# Patient Record
Sex: FEMALE | Race: WHITE | Employment: OTHER | URBAN - METROPOLITAN AREA
[De-identification: names, ages, dates, MRNs, and addresses within clinical notes are randomized per-mention and may not be internally consistent; named-entity substitution may affect disease eponyms.]

---

## 2022-12-28 LAB
ANION GAP SERPL CALCULATED.3IONS-SCNC: 10 MEQ/L (ref 7–16)
BUN BLDV-MCNC: 27 MG/DL (ref 8–23)
CALCIUM SERPL-MCNC: 9.7 MG/DL (ref 8.5–10.5)
CHLORIDE BLD-SCNC: 104 MEQ/L (ref 95–107)
CO2: 25 MEQ/L (ref 19–31)
CREAT SERPL-MCNC: 1.31 MG/DL (ref 0.6–1.3)
EGFR IF NONAFRICAN AMERICAN: 42 ML/MIN/1.73
GLUCOSE: 108 MG/DL (ref 70–99)
POTASSIUM SERPL-SCNC: 4.3 MEQ/L (ref 3.5–5.4)
SODIUM BLD-SCNC: 139 MEQ/L (ref 133–146)

## 2023-10-06 LAB
AMIKACIN: ABNORMAL
AMOXICILLIN + CLAVULANATE: ABNORMAL
AMPICILLIN: ABNORMAL
AZTREONAM: ABNORMAL
CEFAZOLIN: ABNORMAL
CEFEPIME: ABNORMAL
CEFOXITIN: ABNORMAL
CEFTRIAXONE: ABNORMAL
CIPROFLOXACIN: ABNORMAL
CULTURE RESULT: ABNORMAL
ERTAPENEM: ABNORMAL
GENTAMICIN: ABNORMAL
IMIPENEM: ABNORMAL
LEVOFLOXACIN: ABNORMAL
NITROFURANTOIN: ABNORMAL
PIPERACILLIN + TAZOBACTAM: ABNORMAL
TIGECYCLINE: ABNORMAL
TOBRAMYCIN: ABNORMAL
TRIMETHOPRIM + SULFAMETHOXAZOLE: ABNORMAL
URINE CULTURE, ROUTINE: ABNORMAL STATUS

## 2023-10-20 LAB — URINE CULTURE, ROUTINE: NORMAL STATUS

## 2023-10-30 LAB
ALBUMIN, U: 100 MG/DL
AMIKACIN: ABNORMAL
AMOXICILLIN + CLAVULANATE: ABNORMAL
AMPICILLIN: ABNORMAL
AZTREONAM: ABNORMAL
BACTERIA, URINE: ABNORMAL /HPF
BILIRUBIN, URINE: ABNORMAL
CEFAZOLIN: ABNORMAL
CEFEPIME: ABNORMAL
CEFOXITIN: ABNORMAL
CEFTRIAXONE: ABNORMAL
CHARACTER, URINE: ABNORMAL
CIPROFLOXACIN: ABNORMAL
COLOR, URINE: ABNORMAL
CULTURE RESULT: ABNORMAL
ERTAPENEM: ABNORMAL
GENTAMICIN: ABNORMAL
GLUCOSE, URINE: ABNORMAL MG/DL
IMIPENEM: ABNORMAL
KETONES, URINE: ABNORMAL MG/DL
LEUKOCYTE ESTERASE, URINE: ABNORMAL
LEVOFLOXACIN: ABNORMAL
MUCUS, URINE: ABNORMAL /HPF
NITRITE, URINE: ABNORMAL
NITROFURANTOIN: ABNORMAL
OCCULT BLOOD,URINE: ABNORMAL
PH, URINE: 6
PIPERACILLIN + TAZOBACTAM: ABNORMAL
RBC URINE: ABNORMAL /HPF
SPECIFIC GRAVITY UA: 1.01
SQUAMOUS EPITHELIAL CELLS: ABNORMAL /HPF
TIGECYCLINE: ABNORMAL
TOBRAMYCIN: ABNORMAL
TRIMETHOPRIM + SULFAMETHOXAZOLE: ABNORMAL
URINE CULTURE REFLEX: YES
URINE CULTURE, ROUTINE: ABNORMAL STATUS
UROBILINOGEN, URINE: 0.2 MG/DL
WBC URINE: ABNORMAL /HPF

## 2024-02-29 ENCOUNTER — HOSPITAL ENCOUNTER (INPATIENT)
Age: 79
LOS: 2 days | Discharge: HOME | DRG: 690 | End: 2024-03-02
Attending: EMERGENCY MEDICINE | Admitting: HOSPITALIST
Payer: MEDICARE

## 2024-02-29 DIAGNOSIS — N39.0 ACUTE UTI: Primary | ICD-10-CM

## 2024-02-29 PROBLEM — I10 ESSENTIAL HYPERTENSION: Status: ACTIVE | Noted: 2024-02-29

## 2024-02-29 PROBLEM — E78.5 DYSLIPIDEMIA: Status: ACTIVE | Noted: 2024-02-29

## 2024-02-29 PROBLEM — H40.9 GLAUCOMA: Status: ACTIVE | Noted: 2024-02-29

## 2024-02-29 LAB
ANION GAP SERPL CALCULATED.3IONS-SCNC: 13 MMOL/L (ref 9–17)
BACTERIA URNS QL MICRO: ABNORMAL
BASOPHILS # BLD: 0 K/UL (ref 0–0.2)
BASOPHILS NFR BLD: 1 % (ref 0–2)
BILIRUB UR QL STRIP: NEGATIVE
BUN SERPL-MCNC: 24 MG/DL (ref 8–23)
CALCIUM SERPL-MCNC: 9.9 MG/DL (ref 8.6–10.4)
CHARACTER UR: ABNORMAL
CHLORIDE SERPL-SCNC: 99 MMOL/L (ref 98–107)
CLARITY UR: ABNORMAL
CO2 SERPL-SCNC: 24 MMOL/L (ref 20–31)
COLOR UR: YELLOW
CREAT SERPL-MCNC: 1.5 MG/DL (ref 0.5–0.9)
EOSINOPHIL # BLD: 0 K/UL (ref 0–0.4)
EOSINOPHILS RELATIVE PERCENT: 0 % (ref 1–4)
EPI CELLS #/AREA URNS HPF: ABNORMAL /HPF (ref 0–5)
ERYTHROCYTE [DISTWIDTH] IN BLOOD BY AUTOMATED COUNT: 13.7 % (ref 12.5–15.4)
GFR SERPL CREATININE-BSD FRML MDRD: 36 ML/MIN/1.73M2
GLUCOSE SERPL-MCNC: 124 MG/DL (ref 70–99)
GLUCOSE UR STRIP-MCNC: NEGATIVE MG/DL
HCT VFR BLD AUTO: 32.5 % (ref 36–46)
HGB BLD-MCNC: 10.9 G/DL (ref 12–16)
HGB UR QL STRIP.AUTO: ABNORMAL
KETONES UR STRIP-MCNC: ABNORMAL MG/DL
LEUKOCYTE ESTERASE UR QL STRIP: ABNORMAL
LYMPHOCYTES NFR BLD: 0.6 K/UL (ref 1–4.8)
LYMPHOCYTES RELATIVE PERCENT: 14 % (ref 24–44)
MCH RBC QN AUTO: 28.8 PG (ref 26–34)
MCHC RBC AUTO-ENTMCNC: 33.4 G/DL (ref 31–37)
MCV RBC AUTO: 86.3 FL (ref 80–100)
MONOCYTES NFR BLD: 0.4 K/UL (ref 0.1–1.2)
MONOCYTES NFR BLD: 10 % (ref 2–11)
NEUTROPHILS NFR BLD: 75 % (ref 36–66)
NEUTS SEG NFR BLD: 3.2 K/UL (ref 1.8–7.7)
NITRITE UR QL STRIP: NEGATIVE
PH UR STRIP: 6 [PH] (ref 5–8)
PLATELET # BLD AUTO: 262 K/UL (ref 140–450)
PMV BLD AUTO: 8.9 FL (ref 6–12)
POTASSIUM SERPL-SCNC: 3.7 MMOL/L (ref 3.7–5.3)
PROT UR STRIP-MCNC: ABNORMAL MG/DL
RBC # BLD AUTO: 3.77 M/UL (ref 4–5.2)
RBC #/AREA URNS HPF: ABNORMAL /HPF (ref 0–2)
SODIUM SERPL-SCNC: 136 MMOL/L (ref 135–144)
SP GR UR STRIP: 1.02 (ref 1–1.03)
UROBILINOGEN UR STRIP-ACNC: NORMAL EU/DL (ref 0–1)
WBC #/AREA URNS HPF: ABNORMAL /HPF (ref 0–5)
WBC OTHER # BLD: 4.3 K/UL (ref 3.5–11)

## 2024-02-29 PROCEDURE — 87088 URINE BACTERIA CULTURE: CPT

## 2024-02-29 PROCEDURE — 81001 URINALYSIS AUTO W/SCOPE: CPT

## 2024-02-29 PROCEDURE — 36415 COLL VENOUS BLD VENIPUNCTURE: CPT

## 2024-02-29 PROCEDURE — 2580000003 HC RX 258: Performed by: EMERGENCY MEDICINE

## 2024-02-29 PROCEDURE — 6360000002 HC RX W HCPCS: Performed by: EMERGENCY MEDICINE

## 2024-02-29 PROCEDURE — 1200000000 HC SEMI PRIVATE

## 2024-02-29 PROCEDURE — 2580000003 HC RX 258

## 2024-02-29 PROCEDURE — 6370000000 HC RX 637 (ALT 250 FOR IP)

## 2024-02-29 PROCEDURE — 85025 COMPLETE CBC W/AUTO DIFF WBC: CPT

## 2024-02-29 PROCEDURE — 99232 SBSQ HOSP IP/OBS MODERATE 35: CPT | Performed by: HOSPITALIST

## 2024-02-29 PROCEDURE — APPSS45 APP SPLIT SHARED TIME 31-45 MINUTES

## 2024-02-29 PROCEDURE — 99285 EMERGENCY DEPT VISIT HI MDM: CPT

## 2024-02-29 PROCEDURE — 87186 SC STD MICRODIL/AGAR DIL: CPT

## 2024-02-29 PROCEDURE — 87086 URINE CULTURE/COLONY COUNT: CPT

## 2024-02-29 PROCEDURE — 80048 BASIC METABOLIC PNL TOTAL CA: CPT

## 2024-02-29 RX ORDER — ACETAMINOPHEN 325 MG/1
650 TABLET ORAL EVERY 6 HOURS PRN
Status: DISCONTINUED | OUTPATIENT
Start: 2024-02-29 | End: 2024-03-02 | Stop reason: HOSPADM

## 2024-02-29 RX ORDER — LATANOPROST 50 UG/ML
1 SOLUTION/ DROPS OPHTHALMIC NIGHTLY
Status: DISCONTINUED | OUTPATIENT
Start: 2024-02-29 | End: 2024-03-02 | Stop reason: HOSPADM

## 2024-02-29 RX ORDER — BUTALBITAL, ACETAMINOPHEN AND CAFFEINE 50; 325; 40 MG/1; MG/1; MG/1
1 TABLET ORAL EVERY 4 HOURS PRN
Status: DISCONTINUED | OUTPATIENT
Start: 2024-02-29 | End: 2024-03-02 | Stop reason: HOSPADM

## 2024-02-29 RX ORDER — POTASSIUM CHLORIDE 20 MEQ/1
40 TABLET, EXTENDED RELEASE ORAL PRN
Status: DISCONTINUED | OUTPATIENT
Start: 2024-02-29 | End: 2024-03-02 | Stop reason: HOSPADM

## 2024-02-29 RX ORDER — ONDANSETRON 2 MG/ML
4 INJECTION INTRAMUSCULAR; INTRAVENOUS EVERY 6 HOURS PRN
Status: DISCONTINUED | OUTPATIENT
Start: 2024-02-29 | End: 2024-03-02 | Stop reason: HOSPADM

## 2024-02-29 RX ORDER — SODIUM CHLORIDE 9 MG/ML
INJECTION, SOLUTION INTRAVENOUS PRN
Status: DISCONTINUED | OUTPATIENT
Start: 2024-02-29 | End: 2024-03-02 | Stop reason: HOSPADM

## 2024-02-29 RX ORDER — ACETAMINOPHEN 650 MG/1
650 SUPPOSITORY RECTAL EVERY 6 HOURS PRN
Status: DISCONTINUED | OUTPATIENT
Start: 2024-02-29 | End: 2024-03-02 | Stop reason: HOSPADM

## 2024-02-29 RX ORDER — FENOFIBRATE 54 MG/1
54 TABLET ORAL DAILY
Status: DISCONTINUED | OUTPATIENT
Start: 2024-02-29 | End: 2024-03-02 | Stop reason: HOSPADM

## 2024-02-29 RX ORDER — POTASSIUM CHLORIDE 7.45 MG/ML
10 INJECTION INTRAVENOUS PRN
Status: DISCONTINUED | OUTPATIENT
Start: 2024-02-29 | End: 2024-03-02 | Stop reason: HOSPADM

## 2024-02-29 RX ORDER — SODIUM CHLORIDE 0.9 % (FLUSH) 0.9 %
5-40 SYRINGE (ML) INJECTION PRN
Status: DISCONTINUED | OUTPATIENT
Start: 2024-02-29 | End: 2024-03-02 | Stop reason: HOSPADM

## 2024-02-29 RX ORDER — MAGNESIUM SULFATE IN WATER 40 MG/ML
2000 INJECTION, SOLUTION INTRAVENOUS PRN
Status: DISCONTINUED | OUTPATIENT
Start: 2024-02-29 | End: 2024-03-02 | Stop reason: HOSPADM

## 2024-02-29 RX ORDER — SODIUM CHLORIDE 0.9 % (FLUSH) 0.9 %
5-40 SYRINGE (ML) INJECTION EVERY 12 HOURS SCHEDULED
Status: DISCONTINUED | OUTPATIENT
Start: 2024-02-29 | End: 2024-03-02 | Stop reason: HOSPADM

## 2024-02-29 RX ORDER — POLYETHYLENE GLYCOL 3350 17 G/17G
17 POWDER, FOR SOLUTION ORAL DAILY PRN
Status: DISCONTINUED | OUTPATIENT
Start: 2024-02-29 | End: 2024-03-02 | Stop reason: HOSPADM

## 2024-02-29 RX ORDER — ENOXAPARIN SODIUM 100 MG/ML
40 INJECTION SUBCUTANEOUS DAILY
Status: DISCONTINUED | OUTPATIENT
Start: 2024-02-29 | End: 2024-03-02 | Stop reason: HOSPADM

## 2024-02-29 RX ORDER — ATORVASTATIN CALCIUM 40 MG/1
40 TABLET, FILM COATED ORAL DAILY
Status: DISCONTINUED | OUTPATIENT
Start: 2024-02-29 | End: 2024-03-02 | Stop reason: HOSPADM

## 2024-02-29 RX ORDER — AMLODIPINE BESYLATE 5 MG/1
2.5 TABLET ORAL DAILY
Status: DISCONTINUED | OUTPATIENT
Start: 2024-02-29 | End: 2024-03-02 | Stop reason: HOSPADM

## 2024-02-29 RX ORDER — ONDANSETRON 4 MG/1
4 TABLET, ORALLY DISINTEGRATING ORAL EVERY 8 HOURS PRN
Status: DISCONTINUED | OUTPATIENT
Start: 2024-02-29 | End: 2024-03-02 | Stop reason: HOSPADM

## 2024-02-29 RX ADMIN — MEROPENEM 1000 MG: 1 INJECTION, POWDER, FOR SOLUTION INTRAVENOUS at 14:50

## 2024-02-29 RX ADMIN — SODIUM CHLORIDE, PRESERVATIVE FREE 7 ML: 5 INJECTION INTRAVENOUS at 21:00

## 2024-02-29 RX ADMIN — AMLODIPINE BESYLATE 2.5 MG: 5 TABLET ORAL at 20:35

## 2024-02-29 RX ADMIN — LATANOPROST 1 DROP: 50 SOLUTION OPHTHALMIC at 20:25

## 2024-02-29 ASSESSMENT — LIFESTYLE VARIABLES
HOW OFTEN DO YOU HAVE A DRINK CONTAINING ALCOHOL: NEVER
HOW MANY STANDARD DRINKS CONTAINING ALCOHOL DO YOU HAVE ON A TYPICAL DAY: PATIENT DOES NOT DRINK
HOW MANY STANDARD DRINKS CONTAINING ALCOHOL DO YOU HAVE ON A TYPICAL DAY: PATIENT DOES NOT DRINK

## 2024-02-29 ASSESSMENT — PAIN - FUNCTIONAL ASSESSMENT
PAIN_FUNCTIONAL_ASSESSMENT: NONE - DENIES PAIN
PAIN_FUNCTIONAL_ASSESSMENT: NONE - DENIES PAIN

## 2024-02-29 NOTE — ED PROVIDER NOTES
[facility] Emergency Department  [address]  [facility] [address]  Phone: [phone]  Fax: [phone]  EMERGENCY DEPARTMENT ENCOUNTER      Pt Name: Stella Courtney  MRN: [id number]  Birthdate 1945  Date of evaluation: 2/29/2024    CHIEF COMPLAINT       Chief Complaint   Patient presents with    Flank Pain     Pt to ED with c/o R flank pain, sweats, HA, and chills since Monday evening. Pt also notes decreased appetite. Pt sees Dr Bolivar for chronic UTIs.        HISTORY OF PRESENT ILLNESS    Stella Courtney is a 78 y.o. female who presents to the emergency department with right-sided flank pain, chills and diaphoresis since Monday intermittently.  Decreased appetite was sent in by her urologist for evaluation.  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  She was supposed to follow-up with infectious disease yesterday however she was too sick to get out of bed and is scheduled for 2 weeks from now.  She denies any chest pain or shortness of breath.  No hematuria or dysuria.  She denies any other complaints.    REVIEW OF SYSTEMS       Constitutional: No fevers positive chills  HENT: No sore throat, rhinorrhea, or earache   Eyes: No blurry vision or double vision no drainage   Cardiovascular: No chest pain or tachycardia   Respiratory: No wheezing or shortness of breath no cough   Gastrointestinal: Has decreased appetite nausea no vomiting or diarrhea no abdominal pain  : No hematuria or dysuria   Musculoskeletal: No extremity swelling or pain positive right flank pain  Skin: No rash   Neurological: No focal neurologic complaints, paresthesias, weakness, or headache     PAST MEDICAL HISTORY    has a past medical history of Hyperlipidemia and UTI (urinary tract infection).    SURGICAL HISTORY      has a past surgical history that includes Hysterectomy, total abdominal (1989); Breast lumpectomy (2017); Total knee arthroplasty (12/2017); and Cataract  NEGATIVE NEGATIVE    Ketones, Urine TRACE (A) NEGATIVE mg/dL    Specific Gravity, UA 1.020 1.005 - 1.030    Urine Hgb MODERATE (A) NEGATIVE    pH, UA 6.0 5.0 - 8.0    Protein, UA 3+ (A) NEGATIVE mg/dL    Urobilinogen, Urine Normal 0.0 - 1.0 EU/dL    Nitrite, Urine NEGATIVE NEGATIVE    Leukocyte Esterase, Urine LARGE (A) NEGATIVE   Microscopic Urinalysis   Result Value Ref Range    WBC, UA TOO NUMEROUS TO COUNT 0 - 5 /HPF    RBC, UA 2 TO 5 0 - 2 /HPF    Epithelial Cells UA 0 TO 2 0 - 5 /HPF    Bacteria, UA MODERATE (A) None    Other Observations UA Culture ordered based on defined criteria. (A) NOT REQ.       Not indicated unless otherwise documented above    RADIOLOGY:   I reviewed the radiologist interpretations:    No orders to display       Not indicated unless otherwise documented above    EMERGENCY DEPARTMENT COURSE:     The patient was given the following medications:  Orders Placed This Encounter   Medications    meropenem (MERREM) 1,000 mg in sodium chloride 0.9 % 100 mL IVPB (mini-bag)     Order Specific Question:   Antimicrobial Indications     Answer:   Urinary Tract Infection        Vitals:   -------------------------  /71   Pulse 69   Temp 97.9 °F (36.6 °C) (Oral)   Resp 16   Ht 1.753 m (5' 9\")   Wt 81.6 kg (180 lb)   SpO2 99%   BMI 26.58 kg/m²     1:35 PM normal electrolytes kidney function BUN and creatinine slightly elevated 24 and 1.5.  Urinalysis shows large leukocytes negative nitrate with moderate bacteria too numerous to count white blood cells normal white blood cell count and mild anemia hemoglobin 10.9.  Discussed with Dr. Armas who agrees to admission for PICC line placement and arranging of outpatient IV antibiotics.  Recommends IV meropenem.  Patient agreeable to admission    CRITICAL CARE:    None    PROCEDURES:    None      OARRS Report if indicated         FINAL IMPRESSION      1. Acute UTI          DISPOSITION/PLAN   DISPOSITION Admitted 02/29/2024 01:37:58

## 2024-02-29 NOTE — H&P
[facility]  Office: [phone]  Nathaniel Armas DO, Eh Ortega DO, Matt Black DO, Nithin Cutler DO, Ben Marcum MD, Dorothea Lee MD, Samm Dennis MD, Naida Olmedo MD,  Eliazar Mckeon MD, Ethan Mccoy MD, Wade Meyer MD,  Oleg East DO, Umm Zimmer MD, Kasi Tyler MD, Ariel Armas DO, Edith Pena MD,  Aleksandr Mckinley DO, Emperatriz Camargo MD, Farrah Berg MD, Henny Davies MD, Silvano Angel MD,  Dimitri Pagan MD, Betsy Matthews MD, Hector Richards MD, Cornelio Richey MD, Bradley High MD, Edmond Woodson MD, Nagi Hidalgo DO, Rm Allen DO, Ruben Berg MD,  Santana Martin MD, Shirley Waterhouse, CNP,  Ella Hare CNP, Ralph York, CNP,  Cookie Haro, DNP, Hetal Anders, CNP, Bhumi Kay, CNP, Annabelle Menchaca CNP, Mirna Leija CNP, Natividad Galindo, CNP, Mary Grace Amaya, PA-C, Doris Blanchard PA-C, Anitra Bowers, CNP, Sydnie Eldridge, CNP, Rain Basurto, CNP, Sobeida Grey, CNS, Setlla Moss, JAILENE, Lisa Angulo, CNP, Tracy Schwab, CNP         [facility]   IN-PATIENT SERVICE   [facility]    HISTORY AND PHYSICAL EXAMINATION            Date:   2/29/2024  Patient name:  Stella Courtney  Date of admission:  2/29/2024 11:01 AM  MRN:   [id number]  Account:  [id number]  YOB: 1945  PCP:    Amaya Mcmillan MD  Room:   [facility]  Code Status:    No Order      History Obtained From:     patient, electronic medical record    History of Present Illness:     Stella Courtney is a 78 y.o. Non- / non  female who presents with Flank Pain (Pt to ED with c/o R flank pain, sweats, HA, and chills since Monday evening. Pt also notes decreased appetite. Pt sees Dr Bolivar for chronic UTIs. )   and is admitted to the hospital for the management of Complicated urinary tract infection.    Patient is a very pleasant 78-year-old female who is admitted for management of complicated urinary tract infection.  Patient does  36 - 66 %    Lymphocytes % 14 (L) 24 - 44 %    Monocytes % 10 2 - 11 %    Eosinophils % 0 (L) 1 - 4 %    Basophils % 1 0 - 2 %    Neutrophils Absolute 3.20 1.8 - 7.7 k/uL    Lymphocytes Absolute 0.60 (L) 1.0 - 4.8 k/uL    Monocytes Absolute 0.40 0.1 - 1.2 k/uL    Eosinophils Absolute 0.00 0.0 - 0.4 k/uL    Basophils Absolute 0.00 0.0 - 0.2 k/uL   Urinalysis with Reflex to Culture    Collection Time: 02/29/24 11:17 AM    Specimen: Urine, clean catch   Result Value Ref Range    Color, UA Yellow Yellow    Turbidity UA Cloudy (A) Clear    Glucose, Ur NEGATIVE NEGATIVE mg/dL    Bilirubin Urine NEGATIVE NEGATIVE    Ketones, Urine TRACE (A) NEGATIVE mg/dL    Specific Gravity, UA 1.020 1.005 - 1.030    Urine Hgb MODERATE (A) NEGATIVE    pH, UA 6.0 5.0 - 8.0    Protein, UA 3+ (A) NEGATIVE mg/dL    Urobilinogen, Urine Normal 0.0 - 1.0 EU/dL    Nitrite, Urine NEGATIVE NEGATIVE    Leukocyte Esterase, Urine LARGE (A) NEGATIVE   Microscopic Urinalysis    Collection Time: 02/29/24 11:17 AM   Result Value Ref Range    WBC, UA TOO NUMEROUS TO COUNT 0 - 5 /HPF    RBC, UA 2 TO 5 0 - 2 /HPF    Epithelial Cells UA 0 TO 2 0 - 5 /HPF    Bacteria, UA MODERATE (A) None    Other Observations UA Culture ordered based on defined criteria. (A) NOT REQ.   Basic Metabolic Panel    Collection Time: 02/29/24 11:41 AM   Result Value Ref Range    Sodium 136 135 - 144 mmol/L    Potassium 3.7 3.7 - 5.3 mmol/L    Chloride 99 98 - 107 mmol/L    CO2 24 20 - 31 mmol/L    Anion Gap 13 9 - 17 mmol/L    Glucose 124 (H) 70 - 99 mg/dL    BUN 24 (H) 8 - 23 mg/dL    Creatinine 1.5 (H) 0.5 - 0.9 mg/dL    Est, Glom Filt Rate 36 (L) >60 mL/min/1.73m2    Calcium 9.9 8.6 - 10.4 mg/dL       Imaging/Diagnostics:  No results found.    Assessment :      Hospital Problems             Last Modified POA    * (Principal) Complicated urinary tract infection 2/29/2024 Yes    Essential hypertension 2/29/2024 Yes    Dyslipidemia 2/29/2024 Yes    Glaucoma 2/29/2024 Yes       Plan:

## 2024-03-01 PROCEDURE — 99254 IP/OBS CNSLTJ NEW/EST MOD 60: CPT | Performed by: NURSE PRACTITIONER

## 2024-03-01 PROCEDURE — 6360000002 HC RX W HCPCS

## 2024-03-01 PROCEDURE — 6370000000 HC RX 637 (ALT 250 FOR IP)

## 2024-03-01 PROCEDURE — 2580000003 HC RX 258

## 2024-03-01 PROCEDURE — 05HD33Z INSERTION OF INFUSION DEVICE INTO RIGHT CEPHALIC VEIN, PERCUTANEOUS APPROACH: ICD-10-PCS | Performed by: HOSPITALIST

## 2024-03-01 PROCEDURE — 1200000000 HC SEMI PRIVATE

## 2024-03-01 PROCEDURE — APPSS30 APP SPLIT SHARED TIME 16-30 MINUTES

## 2024-03-01 PROCEDURE — 99232 SBSQ HOSP IP/OBS MODERATE 35: CPT | Performed by: HOSPITALIST

## 2024-03-01 RX ORDER — SODIUM CHLORIDE 9 MG/ML
25 INJECTION, SOLUTION INTRAVENOUS PRN
Status: DISCONTINUED | OUTPATIENT
Start: 2024-03-01 | End: 2024-03-02 | Stop reason: HOSPADM

## 2024-03-01 RX ORDER — SODIUM CHLORIDE 0.9 % (FLUSH) 0.9 %
5-40 SYRINGE (ML) INJECTION EVERY 12 HOURS SCHEDULED
Status: DISCONTINUED | OUTPATIENT
Start: 2024-03-01 | End: 2024-03-02 | Stop reason: HOSPADM

## 2024-03-01 RX ORDER — SODIUM CHLORIDE 0.9 % (FLUSH) 0.9 %
5-40 SYRINGE (ML) INJECTION PRN
Status: DISCONTINUED | OUTPATIENT
Start: 2024-03-01 | End: 2024-03-02 | Stop reason: HOSPADM

## 2024-03-01 RX ORDER — SODIUM CHLORIDE 9 MG/ML
INJECTION, SOLUTION INTRAVENOUS CONTINUOUS
Status: DISCONTINUED | OUTPATIENT
Start: 2024-03-01 | End: 2024-03-02 | Stop reason: HOSPADM

## 2024-03-01 RX ORDER — LIDOCAINE HYDROCHLORIDE 10 MG/ML
5 INJECTION, SOLUTION EPIDURAL; INFILTRATION; INTRACAUDAL; PERINEURAL ONCE
Status: DISCONTINUED | OUTPATIENT
Start: 2024-03-01 | End: 2024-03-02 | Stop reason: HOSPADM

## 2024-03-01 RX ADMIN — MEROPENEM 1000 MG: 1 INJECTION, POWDER, FOR SOLUTION INTRAVENOUS at 10:11

## 2024-03-01 RX ADMIN — ACETAMINOPHEN 650 MG: 325 TABLET ORAL at 20:15

## 2024-03-01 RX ADMIN — AMLODIPINE BESYLATE 2.5 MG: 5 TABLET ORAL at 07:53

## 2024-03-01 RX ADMIN — ATORVASTATIN CALCIUM 40 MG: 40 TABLET, FILM COATED ORAL at 07:53

## 2024-03-01 RX ADMIN — SODIUM CHLORIDE, PRESERVATIVE FREE 10 ML: 5 INJECTION INTRAVENOUS at 07:53

## 2024-03-01 RX ADMIN — LATANOPROST 1 DROP: 50 SOLUTION OPHTHALMIC at 20:16

## 2024-03-01 RX ADMIN — SODIUM CHLORIDE: 9 INJECTION, SOLUTION INTRAVENOUS at 10:10

## 2024-03-01 RX ADMIN — MEROPENEM 1000 MG: 1 INJECTION, POWDER, FOR SOLUTION INTRAVENOUS at 20:21

## 2024-03-01 RX ADMIN — FENOFIBRATE 54 MG: 54 TABLET, FILM COATED ORAL at 07:53

## 2024-03-01 ASSESSMENT — PAIN SCALES - GENERAL
PAINLEVEL_OUTOF10: 2
PAINLEVEL_OUTOF10: 0

## 2024-03-01 ASSESSMENT — PAIN DESCRIPTION - DESCRIPTORS: DESCRIPTORS: ACHING;DISCOMFORT

## 2024-03-01 ASSESSMENT — PAIN DESCRIPTION - LOCATION: LOCATION: HEAD

## 2024-03-01 NOTE — CARE COORDINATION
Case Management Assessment  Initial Evaluation    Date/Time of Evaluation: 3/1/2024 10:54 AM  Assessment Completed by: [place] RN    If patient is discharged prior to next notation, then this note serves as note for discharge by case management.    Patient Name: Stella Courtney                   YOB: 1945  Diagnosis: Complicated urinary tract infection [N39.0]  Acute UTI [N39.0]                   Date / Time: 2/29/2024 11:01 AM    Patient Admission Status: Inpatient   Readmission Risk (Low < 19, Mod (19-27), High > 27): Readmission Risk Score: 9    Current PCP: Amaya Mcmillan MD  PCP verified by CM? Yes    Chart Reviewed: Yes      History Provided by: Patient  Patient Orientation: Alert and Oriented, Person, Place, Situation, Self    Patient Cognition: Alert    Hospitalization in the last 30 days (Readmission):  No    If yes, Readmission Assessment in  Navigator will be completed.    Advance Directives:      Code Status: Full Code   Patient's Primary Decision Maker is: Legal Next of Kin      Discharge Planning:    Patient lives with: Family Members Type of Home: House  Primary Care Giver: Self  Patient Support Systems include: Family Members, Friends/Neighbors   Current Financial resources: Medicare  Current community resources: None  Current services prior to admission: Durable Medical Equipment            Current DME: (S) Wheelchair, Cane, Walker (Rollator)            Type of Home Care services:  None    ADLS  Prior functional level: Independent in ADLs/IADLs  Current functional level: Independent in ADLs/IADLs    PT AM-PAC:   /24  OT AM-PAC:   /24    Family can provide assistance at DC: Yes  Would you like Case Management to discuss the discharge plan with any other family members/significant others, and if so, who? No  Plans to Return to Present Housing: Yes  Other Identified Issues/Barriers to RETURNING to current housing: none  Potential Assistance needed at discharge: Home

## 2024-03-01 NOTE — PROGRESS NOTES
03/01/24 1250   Encounter Summary   Encounter Overview/Reason  Volunteer Encounter   Service Provided For: Patient   Referral/Consult From: Audie   Last Encounter  03/01/24   Spiritual/Emotional needs   Type Spiritual Support

## 2024-03-01 NOTE — PROGRESS NOTES
SPIRITUAL CARE DEPARTMENT - [facility]  PROGRESS NOTE    Room # 304/304-01   Name: Stella Courtney               Reason for visit: Volunteer referred     I visited the patient.    Admit Date & Time: 2/29/2024 11:01 AM    Assessment:  Stella Courtney is a 78 y.o. female in the hospital because \"UTI\". Upon entering the room pt was lying in bed. Patient sat up shortly after this writer entered the room and sat in chair. Patient thanked writer for coffee this writer brought her, that volunteer asked him to bring. Patient shared about her current medical challenge. Pt shared feeling \"good\" about the possibility of being discharged soon. Pt appeared to be calm and coping at this time. Pt did not express any other needs or concerns to writer at this time.     Intervention:  I introduced myself and my title as  I offered space for patient  to express feelings, needs, and concerns and provided a ministry presence. Writer delivered coffee to patient that Spiritual Health Partner Volunteer asked him to bring. Write actively listened to patient, and assured pt of his prayers for pt.     Outcome:  Pt expressed gratitude to writer for visit     Plan:  Chaplains will remain available to offer spiritual and emotional support as needed.    Electronically signed by Chaplain Nicolette, on 3/1/2024 at 10:49 AM.  Spiritual Care Department  [facility]        03/01/24 1046   Encounter Summary   Encounter Overview/Reason  Initial Encounter   Service Provided For: Patient   Referral/Consult From: Volunteer   Support System Unknown   Last Encounter  03/01/24   Complexity of Encounter Low   Begin Time 1020   End Time  1028   Total Time Calculated 8 min   Spiritual/Emotional needs   Type Spiritual Support   Assessment/Intervention/Outcome   Assessment Calm;Coping   Intervention Active listening;Sustaining Presence/Ministry of presence;Explored/Affirmed feelings, thoughts, concerns   Outcome Engaged in conversation;Expressed  Gratitude;Coping

## 2024-03-01 NOTE — CONSULTS
[facility]  Initial Consult Note  Date: 3/1/2024    Hospital day :1     Impression:   E. coli urinary tract infection  Recent urine culture 2/14/2024 positive for ESBL E. coli  Essentially pretension    Recommendations   The patient has been started on IV meropenem and can continue on this for now  Discharge plans will be to transition the patient to IV ertapenem on an outpatient basis to complete a 7-day course  Midline placed in the right upper extremity  The patient is okay for discharge from an infectious disease standpoint, we will transition her to IV ertapenem 1 g IV daily on an outpatient basis for ease of daily dosing  She already has an outpatient follow-up scheduled with Dr. Rios in 2 weeks, she can keep this appointment    Chief complaint/reason for consultation:   UTI    History of Present Illness:   Stella Courtney is a 78 y.o.-year-old female who was initially admitted on 2/29/2024.  Patient has a known history of hyperlipidemia, urinary tract infections , most recent was a urine culture 2/14/2024 positive for ESBL E. coli.  She tells me that when she is given antibiotics for urinary tract infections, it is typically just because she has cloudy urine and her urine cultures are positive.  She does report that on Monday she had chills, shakes, and just did not feel well overall.  She apparently was supposed to come to our office 2/28/2024 but felt so poorly she could not get out of bed so she changed her appointment and came to the emergency department 2/29/2024 at the direction of her urologist.  She denies any urinary frequency or urgency, no cloudy urine, she denies any abdominal pain or back pain.  She was started on IV meropenem due to recent urine culture positive for ESBL E. coli.  We were consulted to assist with antibiotic therapy given the ESBL E. coli.    I have personally reviewed the past medical history, past surgical history, medications, social history, and family

## 2024-03-01 NOTE — PROGRESS NOTES
[facility]  Office: [phone]  Nathaniel Armas DO, Eh Ortega DO, Matt Black DO, Nithin Cutler DO, Ben Marcum MD, Dorothea Lee MD, Samm Dennis MD, Naida Olmedo MD,  Eliazar Mckeon MD, Ethan Mccoy MD, Wade Meyer MD,  Oleg East DO, Umm Zimmer MD, Kasi Tyler MD, Ariel Armas DO, Edith Pena MD,  Aleksandr Mckinley DO, Emperatriz Camargo MD, Farrah Berg MD, Henny Davies MD, Silvano Angel MD,  Dimitri Pagan MD, Betsy Matthews MD, Hector Richards MD, Cornelio Richey MD, Bradley High MD, Edmond Woodson MD, Nagi Hidalgo DO, Rm Allen DO, Ruben Berg MD,  Santana Martin MD, Shirley Waterhouse, CNP,  Ella Hare, CNP, Ralph York, CNP,  Cookie Haro, DNP, Hetal Anders, CNP, Bhumi Kay, CNP, Annabelle Menchaca CNP, Mirna Leija, CNP, Natividad Galindo, CNP, Mary Grace Amaya, PA-C, Doris Blanchard, PA-C, Anitra Bowers, CNP, Sydnie Eldridge, CNP, Rain Basurto, CNP, Sobeida Grey, CNS, Stella Moss, JAILENE, Lisa Angulo, CNP, Tracy Schwab, CNP         [facility]   IN-PATIENT SERVICE   [facility]    Progress Note    3/1/2024    8:23 AM    Name:   Stella Courtney  MRN:     [id number]     Acct:      [id number]   Room:   [facility]/304-01   Day:  1  Admit Date:  2/29/2024 11:01 AM    PCP:   Amaya Mcmillan MD  Code Status:  Full Code    Subjective:     Patient is a very pleasant 78-year-old female who is seen in follow-up for acute urinary tract infection.  Patient denies any acute events overnight, including fevers, chills.  I discussed with the patient that we would be consulting infectious disease for further recommendations regarding midline and outpatient IV antibiotics.  Multiple questions answered at bedside.  No additional questions or concerns to the provider at this time.    Medications:     Allergies:    Allergies   Allergen Reactions    Nitrofurantoin Swelling       Current Meds:   Scheduled Meds:    meropenem  1,000 mg

## 2024-03-01 NOTE — CARE COORDINATION
Writer spoke with Stacy @ [facility] Care referral sent per patient request.  [facility] care can accept with possible SOC for IV antibiotic this weekend. Referral to [facility] to [facility], awaiting call back    1308 Writer spoke with Stacy @ [facility] Care can do SOC for Bebo 3/3. VM to [facility] avalos awaiting call back.       1410 Antibiotic scripted faxed to Jesusita BOLDEN to [facility]

## 2024-03-01 NOTE — DISCHARGE INSTR - COC
Continuity of Care Form    Patient Name: Stella Courtney   :  1945  MRN:  [id number]    Admit date:  2024  Discharge date:  3/2/24    Code Status Order: Full Code   Advance Directives:     Admitting Physician:  Ariel Armas DO  PCP: Amaya Mcmillan MD    Discharging Nurse: Magy NEVES  Discharging Hospital Unit/Room#: 304/304-01  Discharging Unit Phone Number: [phone]    Emergency Contact:   Extended Emergency Contact Information  Primary Emergency Contact: susan izquierdo  Home Phone: [phone]  Relation: Brother/Sister  Preferred language: English   needed? No    Past Surgical History:  Past Surgical History:   Procedure Laterality Date    BREAST LUMPECTOMY      CATARACT REMOVAL      HYSTERECTOMY, TOTAL ABDOMINAL (CERVIX REMOVED)      TOTAL KNEE ARTHROPLASTY  2017       Immunization History:   Immunization History   Administered Date(s) Administered    COVID-19, PFIZER PURPLE top, DILUTE for use, (age 12 y+), 30mcg/0.3mL 2021, 2021, 2021       Active Problems:  Patient Active Problem List   Diagnosis Code    Acute UTI N39.0    Essential hypertension I10    Dyslipidemia E78.5    Glaucoma H40.9       Isolation/Infection:   Isolation            No Isolation          Patient Infection Status       None to display            Nurse Assessment:  Last Vital Signs: BP (!) 161/75   Pulse 58   Temp 98 °F (36.7 °C) (Oral)   Resp 16   Ht 1.753 m (5' 9\")   Wt 81.6 kg (180 lb)   SpO2 96%   BMI 26.58 kg/m²     Last documented pain score (0-10 scale):    Last Weight:   Wt Readings from Last 1 Encounters:   24 81.6 kg (180 lb)     Mental Status:  oriented and alert    IV Access:  - Central Venous Catheter  - site  right, insertion date: 3/1/2024    Nursing Mobility/ADLs:  Walking   Independent  Transfer  Independent  Bathing  Independent  Dressing  Independent  Toileting  Independent  Feeding  Independent  Med Admin  Assisted  Med Delivery   whole and  Good    Recommended Labs or Other Treatments After Discharge: None    Physician Certification: I certify the above information and transfer of Stella Courtney  is necessary for the continuing treatment of the diagnosis listed and that she requires Home Care for less than 30 days.     Update Admission H&P: No change in H&P    PHYSICIAN SIGNATURE:  Electronically signed by Ariel Armas DO on 3/1/24 at 9:36 AM EST

## 2024-03-01 NOTE — PROCEDURES
Midline placement note:   Dynamic Access RN    Prescribed IV Therapy = antibiotics for one week  Peripheral ultrasound assessment done. Plan for right vein insertion.   CVR measurement = 4 % (Linear CVR is preferred to be less than 45%).  Product type: Bard 4 fr midline  History/Labs/Allergies Reviewed  Placed By: VONNIE Iglesias  - RN (Dynamic Access)  Time out Performed using Two Identifiers  Lot # [id number]  Expiration date = 04-  Trimmed at 11 cm  External catheter length 0 cm  Number of attempts 1  Special equipment used - Ultrasound and an MST insertion technique  Catheter securement = 3M securement device  Dressing applied= Tegaderm CHG  Lidocaine administered intradermally conc.1%, approx 1ml. (Lot# [id number] and Exp date= 04-)  Device should have blood return, if no blood return assess for infiltration and/or call VAT for consult.  RN aware midline placed and is immediately released for use. No cxr required due to placement of a midline.     Midline education:   [ X ] Post care line insertion was discussed with patient/Family or POA prior to procedure.  Risks, benefits, alternatives, and reason for procedure were discussed and teaching was reinforced. An educational handout on post insertion line care and maintenance was left at bedside with patient or in chart. Patient (Family or POA) acknowledged understanding of information relayed.      Per patient unable to use left arm due to hx of breast cancer.

## 2024-03-02 VITALS
HEART RATE: 60 BPM | HEIGHT: 69 IN | TEMPERATURE: 97.4 F | WEIGHT: 180 LBS | OXYGEN SATURATION: 96 % | RESPIRATION RATE: 16 BRPM | DIASTOLIC BLOOD PRESSURE: 67 MMHG | SYSTOLIC BLOOD PRESSURE: 153 MMHG | BODY MASS INDEX: 26.66 KG/M2

## 2024-03-02 PROBLEM — N39.0 COMPLICATED URINARY TRACT INFECTION: Status: ACTIVE | Noted: 2024-03-02

## 2024-03-02 PROCEDURE — 99232 SBSQ HOSP IP/OBS MODERATE 35: CPT | Performed by: HOSPITALIST

## 2024-03-02 PROCEDURE — 6360000002 HC RX W HCPCS

## 2024-03-02 PROCEDURE — 6360000002 HC RX W HCPCS: Performed by: HOSPITALIST

## 2024-03-02 PROCEDURE — 99232 SBSQ HOSP IP/OBS MODERATE 35: CPT | Performed by: INTERNAL MEDICINE

## 2024-03-02 PROCEDURE — 2580000003 HC RX 258

## 2024-03-02 PROCEDURE — APPSS30 APP SPLIT SHARED TIME 16-30 MINUTES

## 2024-03-02 PROCEDURE — 6370000000 HC RX 637 (ALT 250 FOR IP)

## 2024-03-02 PROCEDURE — 2580000003 HC RX 258: Performed by: HOSPITALIST

## 2024-03-02 RX ADMIN — FENOFIBRATE 54 MG: 54 TABLET, FILM COATED ORAL at 08:26

## 2024-03-02 RX ADMIN — BUTALBITAL, ACETAMINOPHEN, AND CAFFEINE 1 TABLET: 50; 325; 40 TABLET ORAL at 11:47

## 2024-03-02 RX ADMIN — ERTAPENEM SODIUM 1000 MG: 1 INJECTION INTRAMUSCULAR; INTRAVENOUS at 09:44

## 2024-03-02 RX ADMIN — ATORVASTATIN CALCIUM 40 MG: 40 TABLET, FILM COATED ORAL at 08:26

## 2024-03-02 RX ADMIN — SODIUM CHLORIDE: 9 INJECTION, SOLUTION INTRAVENOUS at 05:34

## 2024-03-02 RX ADMIN — AMLODIPINE BESYLATE 2.5 MG: 5 TABLET ORAL at 08:26

## 2024-03-02 ASSESSMENT — PAIN SCALES - GENERAL
PAINLEVEL_OUTOF10: 0
PAINLEVEL_OUTOF10: 5
PAINLEVEL_OUTOF10: 0

## 2024-03-02 ASSESSMENT — PAIN DESCRIPTION - LOCATION: LOCATION: HEAD

## 2024-03-02 ASSESSMENT — PAIN DESCRIPTION - DESCRIPTORS: DESCRIPTORS: ACHING

## 2024-03-02 NOTE — PROGRESS NOTES
amLODIPine  2.5 mg Oral Daily    atorvastatin  40 mg Oral Daily    fenofibrate  54 mg Oral Daily    latanoprost  1 drop Both Eyes Nightly    sodium chloride flush  5-40 mL IntraVENous 2 times per day    enoxaparin  40 mg SubCUTAneous Daily     Continuous Infusions:    sodium chloride 75 mL/hr at 24 0534    sodium chloride      sodium chloride       PRN Meds: sodium chloride flush, sodium chloride, sodium chloride flush, sodium chloride, potassium chloride **OR** potassium alternative oral replacement **OR** potassium chloride, magnesium sulfate, ondansetron **OR** ondansetron, polyethylene glycol, acetaminophen **OR** acetaminophen, butalbital-acetaminophen-caffeine    Data:     Vitals:  BP (!) 153/67   Pulse 60   Temp 97.4 °F (36.3 °C) (Oral)   Resp 16   Ht 1.753 m (5' 9\")   Wt 81.6 kg (180 lb)   SpO2 96%   BMI 26.58 kg/m²   Temp (24hrs), Av.9 °F (36.6 °C), Min:97.4 °F (36.3 °C), Max:98.3 °F (36.8 °C)    No results for input(s): \"POCGLU\" in the last 72 hours.    I/O (24Hr):    Intake/Output Summary (Last 24 hours) at 3/2/2024 0814  Last data filed at 3/1/2024 2015  Gross per 24 hour   Intake 240 ml   Output --   Net 240 ml       Labs:  Hematology:  Recent Labs     24  1115   WBC 4.3   RBC 3.77*   HGB 10.9*   HCT 32.5*   MCV 86.3   MCH 28.8   MCHC 33.4   RDW 13.7      MPV 8.9     Chemistry:  Recent Labs     24  1141      K 3.7   CL 99   CO2 24   GLUCOSE 124*   BUN 24*   CREATININE 1.5*   ANIONGAP 13   LABGLOM 36*   CALCIUM 9.9   No results for input(s): \"PROT\", \"LABALBU\", \"LABA1C\", \"J2BZIBV\", \"L7WMWYC\", \"FT4\", \"TSH\", \"AST\", \"ALT\", \"LDH\", \"GGT\", \"ALKPHOS\", \"LABGGT\", \"BILITOT\", \"BILIDIR\", \"AMMONIA\", \"AMYLASE\", \"LIPASE\", \"LACTATE\", \"CHOL\", \"HDL\", \"LDLCHOLESTEROL\", \"CHOLHDLRATIO\", \"TRIG\", \"VLDL\", \"QJY96TV\", \"PHENYTOIN\", \"PHENYF\", \"URICACID\", \"POCGLU\" in the last 72 hours.  ABG:No results found for: \"POCPH\", \"PHART\", \"PH\", \"POCPCO2\", \"AZN6IMB\", \"PCO2\", \"POCPO2\", \"PO2ART\",

## 2024-03-02 NOTE — DISCHARGE SUMMARY
Pt discharged via wheelchair with all belongings and discharge paperwork. Follow up appointments and discharge instructions reviewed with pt. All question answered to satisfaction. Patient verified that Invanz was delivered to home.Discharged with right UE midline, flushed and capped.

## 2024-03-02 NOTE — PROGRESS NOTES
Call placed to [facility] to ensure that medication will be delivered for admin tomorrow. Pk (representative) will call back shortly when he is home to verify

## 2024-03-02 NOTE — DISCHARGE SUMMARY
[facility]  Office: [phone]  Nathaniel Armas DO, Eh Ortega DO, Matt Black DO, Nithin Cutler DO, Ben Marcum MD, Dorothea Lee MD, Samm Dennis MD, Naida Olmedo MD,  Eliazar Mckeon MD, Ethan Mccoy MD, Wade Meyer MD,  Oleg East DO, Umm Zimmer MD, Kasi Tyler MD, Ariel Armas DO, Edith Pena MD,  Aleksandr Mckinley DO, Emperatriz Camargo MD, Farrah Berg MD, Henny Davies MD, Silvano Angel MD,  Dimitri Pagan MD, Betsy Matthews MD, Hector Richards MD, Cornelio Richey MD, Bradley High MD, Edmond Woodson MD, Nagi Hidalgo DO, Rm Allen DO, Ruben Berg MD,  Santana Martin MD, Shirley Waterhouse, CNP,  Ella Hare, CNP, Ralph York, CNP,  Cookie Haro, [facility], Hetal Anders, CNP, Bhumi Kay, CNP, Annabelle Menchaca CNP, Mirna Leija, CNP, Natividad Galindo, CNP, TAWNY Grullon-C, Doris Blanchard PA-C, Anitra Bowers, CNP, Sydnie Eldridge, CNP, Rain Basurto, CNP, Sobeida Grey, CNS, Stella Moss, [facility], Lisa Angulo, CNP, Tracy Schwab, CNP         [facility]   IN-PATIENT SERVICE   [facility]    Discharge Summary     Patient ID: Stella Courtney  :  1945   MRN: [id number]     ACCOUNT:  [id number]   Patient's PCP: Amaya Mcmillan MD  Admit Date: 2024   Discharge Date: 3/2/2024  Length of Stay: 2  Code Status:  Full Code  Admitting Physician: Ariel Armas DO  Discharge Physician: TAWNY De La Vega     Active Discharge Diagnoses:     Hospital Problem Lists:  Principal Problem:    Acute UTI  Active Problems:    Essential hypertension    Dyslipidemia    Glaucoma  Resolved Problems:    * No resolved hospital problems. *      Admission Condition:  good     Discharged Condition: good    Hospital Stay:     Hospital Course:  Stella Courtney is a 78 y.o. female who was admitted for the management of Acute UTI , presented to ER with Flank Pain (Pt to ED with c/o R flank pain, sweats, HA, and chills since Monday  AM     ESCHERICHIA COLI >100,000 CFU/ML This organism is an Extended Spectrum Beta Lactamase (ESBL)  and resistance to therapy with Penicillins, Cephalosporins and Aztreonam is expected. These organisms generally remain susceptible to Carbapenems. Consider ID Consultation. CONTACT PRECAUTIONS INDICATED.         Radiology:  No results found.    Consultations:    Consults:     Final Specialist Recommendations/Findings:   IP CONSULT TO INFECTIOUS DISEASES  IP CONSULT TO IV TEAM  IP CONSULT TO HOME CARE NEEDS      The patient was seen and examined on day of discharge and this discharge summary is in conjunction with any daily progress note from day of discharge.    Discharge plan:     Disposition: Home    Physician Follow Up:   Amaya Mcimllan MD  [address]  [facility] [id number] [phone]    Follow up      Zoila Rios MD  [address] [facility] [address] [phone]    Follow up      Alex Bolivar MD  [address] [facility] [id number] [phone]    Follow up      [address]  [address] [facility] [place] [id number] [phone]        [place] [id number] [phone]  Follow up  Delivery of IV Antibiotic Ivanz for SOC on 3/3       Requiring Further Evaluation/Follow Up POST HOSPITALIZATION/Incidental Findings: None    Diet: regular diet    Activity: As tolerated    Instructions to Patient: Please take the medications below as prescribed.  Please follow-up with the providers as listed above.    Discharge Medications:      Medication List        START taking these medications      ertapenem  infusion  Commonly known as: INVanz  Infuse 1,000 mg intravenously every 24 hours for 5 days Compound per protocol.            CONTINUE taking these medications      amLODIPine 2.5 MG tablet  Commonly known as: NORVASC     atorvastatin 40 MG tablet  Commonly known as: LIPITOR     Combigan 0.2-0.5 % ophthalmic solution  Generic drug:

## 2024-03-02 NOTE — PROGRESS NOTES
[facility]  Initial Consult Note  Date: 3/2/2024    Hospital day :2     Impression:   E. coli urinary tract infection  Recent urine culture 2/14/2024 positive for ESBL+  E. coli  Essential HTN    Recommendations   The patient has been started on IV meropenem and can continue on this for now  Discharge plans will be to transition the patient to IV ertapenem on an outpatient basis to complete a 7-day course  Midline placed in the right upper extremity  The patient is okay for discharge from an infectious disease standpoint, we will transition her to IV ertapenem 1 g IV daily on an outpatient basis for ease of daily dosing  She already has an outpatient follow-up scheduled with Dr. Rios in 2 weeks, she can keep this appointment    Chief complaint/reason for consultation:   UTI    History of Present Illness:   Stella Courtney is a 78 y.o.-year-old female who was initially admitted on 2/29/2024.      Patient has a known history of hyperlipidemia, urinary tract infections , most recent was a urine culture 2/14/2024 positive for ESBL E. coli.      She tells me that when she is given antibiotics for urinary tract infections, it is typically just because she has cloudy urine and her urine cultures are positive.      She does report that on Monday she had chills, shakes, and just did not feel well overall.  She apparently was supposed to come to our office 2/28/2024 but felt so poorly she could not get out of bed so she changed her appointment and came to the emergency department 2/29/2024 at the direction of her urologist.      She denies any urinary frequency or urgency, no cloudy urine, she denies any abdominal pain or back pain.  She was started on IV meropenem due to recent urine culture positive for ESBL E. coli.  We were consulted to assist with antibiotic therapy given the ESBL E. coli.      CURRENT EVALUATION :3/2/2024  BP (!) 153/67   Pulse 60   Temp 97.4 °F (36.3 °C) (Oral)   Resp 16      Aztreonam  () I   Ertapenem  () S   Imipenem  () S   Amikacin  () I   Gentamicin  () S   Tobramycin  () R High    Ciprofloxacin  () R High    Levofloxacin  () R High    Tigecycline  () S   Nitrofurantoin  () I   Trimethoprim + Sulfamethoxazole  () R High    Culture Result  () FINAL   Comment: CULTURE SOURCE:  CLEAN CATCH MID-STREAM URINE  ISOLATE:         ESCHERICHIA COLI  COLONY COUNT:    > 10,000 BUT < 100,000 CFU/ML  SENSITIVITY INTERPRETATION  S = SUSCEPTIBLE  R = RESISTANT  I = INTERMEDIATE  N/A = NOT APPLICABLE   Resulting Agency [facility]              Narrative  Performed by: [facility]  UAFACILITY: [facility] LAB SERVICE [id number]      Specimen Collected: 02/14/24 08:56 EST Last Resulted: 02/18/24 12:49 EST               Electronically signed by Khanh Dobbs MD on 3/2/2024 at 7:49 AM      [facility]  Khanh Dobbs MD  Perfect [employer] messaging  OFFICE: [phone]    Thank you for allowing us to participate in the care of this patient. Please call with questions.     This note is created with the assistance of a speech recognition program.  While intending to generate a document that actually reflects the content of the visit, the document can still have some errors including those of syntax and sound a like substitutions which may escape proof reading.  In such instances, actual meaning can be extrapolated by contextual diversion.

## 2024-03-02 NOTE — PLAN OF CARE
Problem: Discharge Planning  Goal: Discharge to home or other facility with appropriate resources  3/1/2024 1040 by Minal Chou RN  Outcome: Adequate for Discharge  Flowsheets (Taken 3/1/2024 0751)  Discharge to home or other facility with appropriate resources: Identify barriers to discharge with patient and caregiver  3/1/2024 0133 by Eligio Theodore RN  Outcome: Progressing  Flowsheets (Taken 2/29/2024 2036)  Discharge to home or other facility with appropriate resources:   Identify barriers to discharge with patient and caregiver   Arrange for needed discharge resources and transportation as appropriate   Identify discharge learning needs (meds, wound care, etc)   Arrange for interpreters to assist at discharge as needed   Refer to discharge planning if patient needs post-hospital services based on physician order or complex needs related to functional status, cognitive ability or social support system     Problem: Safety - Adult  Goal: Free from fall injury  3/1/2024 1040 by Minal Chou RN  Outcome: Adequate for Discharge  3/1/2024 0133 by Eligio Theodore RN  Outcome: Progressing     Problem: Skin/Tissue Integrity  Goal: Absence of new skin breakdown  Description: 1.  Monitor for areas of redness and/or skin breakdown  2.  Assess vascular access sites hourly  3.  Every 4-6 hours minimum:  Change oxygen saturation probe site  4.  Every 4-6 hours:  If on nasal continuous positive airway pressure, respiratory therapy assess nares and determine need for appliance change or resting period.  3/1/2024 1040 by Minal Chou RN  Outcome: Adequate for Discharge  3/1/2024 0133 by Eligio Theodore RN  Outcome: Progressing     Problem: Chronic Conditions and Co-morbidities  Goal: Patient's chronic conditions and co-morbidity symptoms are monitored and maintained or improved  3/1/2024 1040 by Minal Chou RN  Outcome: Adequate for Discharge  Flowsheets (Taken 3/1/2024 0751)  Care Plan - Patient's Chronic 
  Problem: Discharge Planning  Goal: Discharge to home or other facility with appropriate resources  Outcome: Progressing  Flowsheets (Taken 2/29/2024 1830)  Discharge to home or other facility with appropriate resources:   Identify barriers to discharge with patient and caregiver   Identify discharge learning needs (meds, wound care, etc)   Arrange for needed discharge resources and transportation as appropriate     Problem: Safety - Adult  Goal: Free from fall injury  Outcome: Progressing     Problem: Skin/Tissue Integrity  Goal: Absence of new skin breakdown  Description: 1.  Monitor for areas of redness and/or skin breakdown  2.  Assess vascular access sites hourly  3.  Every 4-6 hours minimum:  Change oxygen saturation probe site  4.  Every 4-6 hours:  If on nasal continuous positive airway pressure, respiratory therapy assess nares and determine need for appliance change or resting period.  Outcome: Progressing     Problem: Chronic Conditions and Co-morbidities  Goal: Patient's chronic conditions and co-morbidity symptoms are monitored and maintained or improved  Outcome: Progressing     
  Problem: Discharge Planning  Goal: Discharge to home or other facility with appropriate resources  Outcome: Progressing  Note: Discharge planning will begin when the patient meets discharge criteria to go home or to a facility      Problem: Safety - Adult  Goal: Free from fall injury  Outcome: Progressing  Note: Call light within reach, bed alarm on, socks on and bed in lowest position      Problem: Skin/Tissue Integrity  Goal: Absence of new skin breakdown  Description: 1.  Monitor for areas of redness and/or skin breakdown  2.  Assess vascular access sites hourly  3.  Every 4-6 hours minimum:  Change oxygen saturation probe site  4.  Every 4-6 hours:  If on nasal continuous positive airway pressure, respiratory therapy assess nares and determine need for appliance change or resting period.  Outcome: Progressing  Note: Patient is able to move around to prevent skin breakdown     Problem: Pain  Goal: Verbalizes/displays adequate comfort level or baseline comfort level  Outcome: Progressing  Note: Pain is being managed with rest, repositioning and medication      
RN  Outcome: Progressing  2/29/2024 1843 by Ryan Lowery, RN  Outcome: Progressing

## 2024-03-03 LAB
MICROORGANISM SPEC CULT: ABNORMAL
SPECIMEN DESCRIPTION: ABNORMAL

## 2024-03-04 ENCOUNTER — TELEPHONE (OUTPATIENT)
Dept: INFECTIOUS DISEASES | Age: 79
End: 2024-03-04

## 2024-03-04 NOTE — TELEPHONE ENCOUNTER
Patient called stating she originally had a new patient appointment scheduled with Dr. Rios on 3/21, however she was seen in hospital this past week. Disharge notes of Anne Gonzalez NP, keep appt for follow up she already has.      Patient is having IV infusions with [id number].  Meds supposed to end 3/6 and she wants to know when PICC line will be removed that day.  Please advise, thank you.  Does patient need any labs done prior to appt on 3/21?

## 2024-03-04 NOTE — TELEPHONE ENCOUNTER
Cookie from [facility] #[phone] (infusion pharmacy) called and asked same question patient did. Could you please call her when get answer and she will inform the home care. Thanks

## 2024-03-05 NOTE — TELEPHONE ENCOUNTER
Called [facility] and spoke to Cookie to let her know last infusion 3/6/24 and PICC line can be removed.  She will place order to home health. Called patient to let her know as well.  No labs needed.  Follow up 3/21/24.  All questions answered.

## 2024-03-05 NOTE — TELEPHONE ENCOUNTER
Read 3/5/2024 11:07 AM  3/5/2024 11:08 AM  She told me her appt was next week. But yes , have her keep the appt but it can be switched to a hospital follow up I'd think. The line can be removed after the antibiotics are done  3/5/2024 1:18 PM  I switched to hosp f/u already. Thanks so much. Good on labs then also or need any ordered? If so I can place in chart. TY  Read 3/5/2024 1:19 PM  3/5/2024 1:19 PM  No labs. Thanks   End Conversation

## 2024-03-21 ENCOUNTER — OFFICE VISIT (OUTPATIENT)
Dept: INFECTIOUS DISEASES | Age: 79
End: 2024-03-21
Payer: MEDICARE

## 2024-03-21 VITALS
SYSTOLIC BLOOD PRESSURE: 123 MMHG | BODY MASS INDEX: 26.96 KG/M2 | WEIGHT: 182 LBS | HEIGHT: 69 IN | TEMPERATURE: 97 F | HEART RATE: 45 BPM | DIASTOLIC BLOOD PRESSURE: 63 MMHG

## 2024-03-21 DIAGNOSIS — N39.0 URINARY TRACT INFECTION DUE TO EXTENDED-SPECTRUM BETA LACTAMASE (ESBL) PRODUCING ESCHERICHIA COLI: Primary | ICD-10-CM

## 2024-03-21 DIAGNOSIS — B96.29 URINARY TRACT INFECTION DUE TO EXTENDED-SPECTRUM BETA LACTAMASE (ESBL) PRODUCING ESCHERICHIA COLI: Primary | ICD-10-CM

## 2024-03-21 DIAGNOSIS — Z16.12 URINARY TRACT INFECTION DUE TO EXTENDED-SPECTRUM BETA LACTAMASE (ESBL) PRODUCING ESCHERICHIA COLI: Primary | ICD-10-CM

## 2024-03-21 PROCEDURE — 1123F ACP DISCUSS/DSCN MKR DOCD: CPT | Performed by: INTERNAL MEDICINE

## 2024-03-21 PROCEDURE — 3078F DIAST BP <80 MM HG: CPT | Performed by: INTERNAL MEDICINE

## 2024-03-21 PROCEDURE — 99213 OFFICE O/P EST LOW 20 MIN: CPT | Performed by: INTERNAL MEDICINE

## 2024-03-21 PROCEDURE — 3074F SYST BP LT 130 MM HG: CPT | Performed by: INTERNAL MEDICINE

## 2024-03-21 RX ORDER — ANASTROZOLE 1 MG/1
1 TABLET ORAL DAILY
COMMUNITY

## 2024-03-21 ASSESSMENT — ENCOUNTER SYMPTOMS
GASTROINTESTINAL NEGATIVE: 1
RESPIRATORY NEGATIVE: 1

## 2024-03-21 NOTE — PROGRESS NOTES
Patient: Israel Asher    Pre-op Dx: CAD, angina    Post-op Dx: same    Procedure: Left HC, IVUS of LAD, PTCA of LAD    Surgeon:João Lopez MD, MD    Assistants: none    Anesthesia Staff: No anesthesia staff entered.    Anesthesia Type: sedation    Findings: LAD - incompletely expanded LAD stent, no spasm distally to the stent    Estimated Blood Loss: < 10 ml    Complications: none    Specimens Removed: none       [facility]  Office Progress Note  Today's Date and Time: 3/21/2024, 10:20 AM    Impression:     1. Urinary tract infection due to extended-spectrum beta lactamase (ESBL) producing Escherichia coli         Recommendations   At this point in time the patient is doing well and does not have any further urinary concerns.  Her urinary tract infection secondary to ESBL E. coli has been treated and is resolved.  The patient does have risk of recurrent infections in the future given her infections in the past.  We discussed antibiotic options in the future to include oral fosfocmycin versus the IV antibiotic therapy with meropenem again  We also discussed the issue of oral suppressive antibiotic therapy in terms of that her options are limited given this is a drug-resistant organism and the fact that this also tends to lead to resistance in the future  The patient has taken d-mannose without any improvement  The patient can follow-up with me on an as-needed basis    I have ordered the following medications/ labs:  No orders of the defined types were placed in this encounter.     No orders of the defined types were placed in this encounter.      Chief complaint/reason for consultation:     Chief Complaint   Patient presents with    Frequent/Recurrent UTI        History of Present Illness:   Stella Courtney is a 78 y.o.-year-old female who was recently hospitalized and has hyperlipidemia, urinary tract infections , most recent was a urine culture 2/14/2024 positive for ESBL E. coli.       She tells me that when she is given antibiotics for urinary tract infections, it is typically just because she has cloudy urine and her urine cultures are positive.       She developed chills, shakes, and came to the emergency department 2/29/2024 at the direction of her urologist.       She was started on IV meropenem due to recent urine culture positive for ESBL E. coli.  We were consulted to assist with antibiotic

## 2024-06-05 ENCOUNTER — APPOINTMENT (OUTPATIENT)
Dept: GENERAL RADIOLOGY | Age: 79
End: 2024-06-05
Payer: MEDICARE

## 2024-06-05 ENCOUNTER — HOSPITAL ENCOUNTER (EMERGENCY)
Age: 79
Discharge: HOME | End: 2024-06-05
Attending: EMERGENCY MEDICINE
Payer: MEDICARE

## 2024-06-05 VITALS
WEIGHT: 185 LBS | RESPIRATION RATE: 17 BRPM | TEMPERATURE: 98.1 F | BODY MASS INDEX: 27.4 KG/M2 | DIASTOLIC BLOOD PRESSURE: 63 MMHG | SYSTOLIC BLOOD PRESSURE: 142 MMHG | HEIGHT: 69 IN | OXYGEN SATURATION: 97 % | HEART RATE: 45 BPM

## 2024-06-05 DIAGNOSIS — I10 HYPERTENSION, UNSPECIFIED TYPE: Primary | ICD-10-CM

## 2024-06-05 LAB
ANION GAP SERPL CALCULATED.3IONS-SCNC: 11 MMOL/L (ref 9–17)
BASOPHILS # BLD: 0.1 K/UL (ref 0–0.2)
BASOPHILS NFR BLD: 1 % (ref 0–2)
BUN SERPL-MCNC: 27 MG/DL (ref 8–23)
CALCIUM SERPL-MCNC: 9.4 MG/DL (ref 8.6–10.4)
CHLORIDE SERPL-SCNC: 108 MMOL/L (ref 98–107)
CO2 SERPL-SCNC: 24 MMOL/L (ref 20–31)
CREAT SERPL-MCNC: 1.1 MG/DL (ref 0.5–0.9)
EOSINOPHIL # BLD: 0.3 K/UL (ref 0–0.4)
EOSINOPHILS RELATIVE PERCENT: 5 % (ref 1–4)
ERYTHROCYTE [DISTWIDTH] IN BLOOD BY AUTOMATED COUNT: 14.4 % (ref 12.5–15.4)
GFR, ESTIMATED: 52 ML/MIN/1.73M2
GLUCOSE SERPL-MCNC: 105 MG/DL (ref 70–99)
HCT VFR BLD AUTO: 26 % (ref 36–46)
HGB BLD-MCNC: 8.5 G/DL (ref 12–16)
LYMPHOCYTES NFR BLD: 2 K/UL (ref 1–4.8)
LYMPHOCYTES RELATIVE PERCENT: 40 % (ref 24–44)
MCH RBC QN AUTO: 26.6 PG (ref 26–34)
MCHC RBC AUTO-ENTMCNC: 32.5 G/DL (ref 31–37)
MCV RBC AUTO: 81.9 FL (ref 80–100)
MONOCYTES NFR BLD: 0.6 K/UL (ref 0.1–1.2)
MONOCYTES NFR BLD: 13 % (ref 2–11)
NEUTROPHILS NFR BLD: 41 % (ref 36–66)
NEUTS SEG NFR BLD: 2 K/UL (ref 1.8–7.7)
PLATELET # BLD AUTO: 289 K/UL (ref 140–450)
PMV BLD AUTO: 9.1 FL (ref 6–12)
POTASSIUM SERPL-SCNC: 4.1 MMOL/L (ref 3.7–5.3)
RBC # BLD AUTO: 3.18 M/UL (ref 4–5.2)
SODIUM SERPL-SCNC: 143 MMOL/L (ref 135–144)
TROPONIN I SERPL HS-MCNC: 10 NG/L (ref 0–14)
WBC OTHER # BLD: 5 K/UL (ref 3.5–11)

## 2024-06-05 PROCEDURE — 99285 EMERGENCY DEPT VISIT HI MDM: CPT

## 2024-06-05 PROCEDURE — 80048 BASIC METABOLIC PNL TOTAL CA: CPT

## 2024-06-05 PROCEDURE — 84484 ASSAY OF TROPONIN QUANT: CPT

## 2024-06-05 PROCEDURE — 93005 ELECTROCARDIOGRAM TRACING: CPT | Performed by: NURSE PRACTITIONER

## 2024-06-05 PROCEDURE — 71045 X-RAY EXAM CHEST 1 VIEW: CPT

## 2024-06-05 PROCEDURE — 36415 COLL VENOUS BLD VENIPUNCTURE: CPT

## 2024-06-05 PROCEDURE — 85025 COMPLETE CBC W/AUTO DIFF WBC: CPT

## 2024-06-05 ASSESSMENT — PAIN - FUNCTIONAL ASSESSMENT: PAIN_FUNCTIONAL_ASSESSMENT: NONE - DENIES PAIN

## 2024-06-05 NOTE — ED PROVIDER NOTES
[facility] Emergency Department  [address]  [place] [address]  Phone: [phone]  Fax: [phone]      Attending Physician Attestation          CHIEF COMPLAINT       Chief Complaint   Patient presents with    Hypertension    Dizziness       DIAGNOSTIC RESULTS     LABS:  Labs Reviewed   BASIC METABOLIC PANEL - Abnormal; Notable for the following components:       Result Value    Chloride 108 (*)     Glucose 105 (*)     BUN 27 (*)     Creatinine 1.1 (*)     Est, Glom Filt Rate 52 (*)     All other components within normal limits   CBC WITH AUTO DIFFERENTIAL - Abnormal; Notable for the following components:    RBC 3.18 (*)     Hemoglobin 8.5 (*)     Hematocrit 26.0 (*)     Monocytes % 13 (*)     Eosinophils % 5 (*)     All other components within normal limits   TROPONIN       All other labs were within normal range or not returned as of this dictation.    RADIOLOGY:  XR CHEST PORTABLE   Final Result   Borderline cardiomegaly.  No focal consolidation.               EMERGENCY DEPARTMENT COURSE:   Vitals:    Vitals:    06/05/24 0836 06/05/24 0908   BP: (!) 174/75 (!) 142/63   Pulse: 66 (!) 45   Resp: 18 17   Temp: 98.1 °F (36.7 °C)    TempSrc: Oral    SpO2: 98% 97%   Weight: 83.9 kg (185 lb)    Height: 1.753 m (5' 9\")      -------------------------  BP: (!) 142/63, Temp: 98.1 °F (36.7 °C), Pulse: (!) 45, Respirations: 17      ED Course as of 06/05/24 0940   Wed Jun 05, 2024   0847 Sinus bradycardia.  Ventricular rate of 48.  No ST elevation or depression.  T wave inversion in lead III.  Normal axis.  Incomplete right bundle branch block. [NA]      ED Course User Index  [NA] Henny Valencia MD         PERTINENT ATTENDING PHYSICIAN COMMENTS:    I performed a history and physical examination of the patient and discussed management with the mid level provider. I reviewed the mid level provider's note and agree with the documented findings and plan of care. Any areas of

## 2024-06-05 NOTE — ED PROVIDER NOTES
[facility] EMERGENCY DEPARTMENT  EMERGENCY DEPARTMENT ENCOUNTER      Pt Name: Stella Courtney  MRN: [id number]  Birthdate 1945  Date of evaluation: 6/5/2024  Provider: GAGAN Del Cid CNP    CHIEF COMPLAINT       Chief Complaint   Patient presents with    Hypertension    Dizziness         HISTORY OF PRESENT ILLNESS   (Location/Symptom, Timing/Onset, Context/Setting, Quality, Duration, Modifying Factors, Severity)  Note limiting factors.   Stella Courtney is a 78 y.o. female who presents to the emergency department for evaluation of elevated blood pressure.  Patient states she noted her blood pressures been elevated over the past 3 days.  She states her systolic got as high as 200.  She takes amlodipine for her blood pressure.  She states she did call her doctor's office and they were able to get her in today at 3:00.  She states the nurse told her that she could be having a stroke so she should come over to the emergency department to be evaluated.  She states since Monday she had some intermittent headache, dizziness and sinus congestion.  But that has all resolved.  She is asymptomatic at this time.  She denies any visual problems no difficulty thinking or speaking no difficulty moving her upper or lower extremities.  Denies any strokelike symptoms.  Denies headache.        Nursing Notes were reviewed.    REVIEW OF SYSTEMS       Constitutional: No fevers or chills   HEENT: No sore throat, rhinorrhea, or earache   Eyes: No blurry vision or double vision no drainage   Cardiovascular: No chest pain or tachycardia   Respiratory: No wheezing or shortness of breath no cough   Gastrointestinal: No nausea, vomiting, diarrhea, constipation, or abdominal pain   : No hematuria or dysuria   Musculoskeletal: No swelling or pain   Skin: No rash   Neurological: No focal neurologic complaints, paresthesias, weakness, or headache      Except as noted above the remainder of the review of systems was reviewed and  negative.       PAST MEDICAL HISTORY     Past Medical History:   Diagnosis Date    Hyperlipidemia     UTI (urinary tract infection)     frequent       SURGICAL HISTORY       Past Surgical History:   Procedure Laterality Date    BREAST LUMPECTOMY  2017    CATARACT REMOVAL      HYSTERECTOMY, TOTAL ABDOMINAL (CERVIX REMOVED)  1989    TOTAL KNEE ARTHROPLASTY  12/2017       CURRENT MEDICATIONS       Discharge Medication List as of 6/5/2024  9:48 AM        CONTINUE these medications which have NOT CHANGED    Details   anastrozole (ARIMIDEX) 1 MG tablet Take 1 tablet by mouth dailyHistorical Med      amLODIPine (NORVASC) 2.5 MG tablet Take 1 tablet by mouth dailyHistorical Med      atorvastatin (LIPITOR) 40 MG tablet Take 1 tablet by mouth dailyHistorical Med      fenofibrate (TRICOR) 145 MG tablet Take 1 tablet by mouth dailyHistorical Med      LUMIGAN 0.01 % SOLN ophthalmic drops Place into both eyes nightly , DAWHistorical Med      Multiple Vitamins-Minerals (ICAPS) TABS Take by mouth dailyHistorical Med      COMBIGAN 0.2-0.5 % ophthalmic solution Place 1 drop into both eyes, DAWHistorical Med             ALLERGIES     Nitrofurantoin and Penicillins    FAMILY HISTORY       Family History   Problem Relation Age of Onset    Hypertension Mother     Hypertension Father     Hypertension Sister     Hypertension Brother         SOCIAL HISTORY       Social History     Socioeconomic History    Marital status:      Spouse name: None    Number of children: None    Years of education: None    Highest education level: None   Tobacco Use    Smoking status: Never    Smokeless tobacco: Never   Vaping Use    Vaping Use: Never used   Substance and Sexual Activity    Alcohol use: Not Currently    Drug use: Never     Social Determinants of Health     Food Insecurity: No Food Insecurity (2/29/2024)    Hunger Vital Sign     Worried About Running Out of Food in the Last Year: Never true     Ran Out of Food in the Last Year: Never true

## 2024-06-05 NOTE — DISCHARGE INSTRUCTIONS
Please see your family doctor today at 3:00 as scheduled    Please discuss your elevated blood pressure readings and your hemoglobin of 8.5 for further evaluation and continued care    If you have any worsening symptoms or any new or concerning symptoms arise please return immediately to the emergency department   Tylenol 3.9 ml (160mg/5ml concentration) or Motrin 4.1 ml (100mg/5ml concentration)     ANTICIPATORY GUIDANCE: 12 MONTH    NUTRITION:  Growth slows down after [age] year of age. Tal's appetite may decrease and become more sporadic. Serve Tal food in small portions.  Let him be the  of how much to eat. You should be the one to control what food is available and when. Set a good example right from the start.    If you have not switched from formula yet, you may start cow's milk if Tal is not sensitive to dairy products. Use whole milk if he is still on baby food. You may use lower fat milk if Tal eats table foods, because those foods are higher in fat. He needs some fats or oils in the diet in order to grow. Even if there is heart disease in the family, Tal should not be fat-restricted before the age of two years.    If Tal is still nursing, you may wish to supplement with beverages from a cup to ease the transition to independent drinking.Wean off Breast Feeding .    Excessive fruit juice may cause gas, crabbiness, and bowel movements that vary from diarrhea to constipation. Whole fruit is better for Tal. There is no reason to give juice unless your child is constipated.  Limit juice to 4 oz per day.    A multivitamin is recommended for picky eaters and optional for good eaters. You should give your child supplemental Vitamin D through the year (400-600 IU), available over the counter, without a prescription.    Obesity and being overweight are serious problems in the [place]. You can help your child avoid these problems by following these guidelines:  Limit TV and video total time to 2 hours per day or less.  Don't encourage your children to eat if they tell you they are full. Healthy children will not starve themselves.  Don't reward your children for cleaning their plates. If they always leave food, reduce the size of the portions and tell them to ask for more if they are still  hungry.  Don't allow children to dish out their own portions. They will imitate adults or imitate what they have seen on TV; in both cases, the amount will be too large. This results in increased calories and waste. At least for children above 5 years of age and for adults, people eat more when given larger portions.    DEVELOPMENT/BEHAVIOR:    Children at this age generally will attempt to walk or improve their walking. 10 percent of normal children do not walk by 15 months, so there is a wide range. They should be able to drink from a cup and point to things they want.  Babbling and imitating adult sounds are common at this age.  Tal will begin to assert his independence at about the same time that he walks independently.  Discipline  Parents should discuss and agree on the approach to discipline and which behaviors are to be forbidden.  Remember that things that are \"cute\" (like hitting) when Tal is young will not be so cute when he is a teenager.   Long lectures after Tal misbehaves will not work; rather they actually reinforce the misbehavior by giving it attention. After all, attention is the thing that Tal most desires from you.  Help yourself by being brief, by walling off hazards and breakables, and by distracting Tal from tantrums.  Nobody has the energy to teach all the rules at once.  Work on a few things at a time.  Children are smart enough to learn that dadjose e and mommy may tolerate different things but for dangerous behavior it is important for all caretakers to be consistent.  Tal will learn faster if the messages about dangerous activities are always the same.  Tantrums will appear soon.    Ask for a patient information sheet if you are having problems.    ACCIDENT PREVENTION:  Falls:  Remove furniture with sharp edges.  Coffee tables are especially hazardous.  Use blanco on stairs and window latches on upper-story windows.  Choking:  Avoid peanuts, gum, hard candy, raisins, and popcorn  until age five.  Children can choke on foods that they can't chew well.  Don't let Tal eat while running or playing.  Be sure foods such as grapes(always cut in quarters), peas, and corn are prepared in a size and consistency that he can handle.  Carlson:  Begin to teach hot and cold.  Have a family fire safety plan, including regular smoke detector battery checks, working fire extinguishers, and two planned escape routes.  Poisoning:   Remove the following items from reach or place them in a locked cabinet: Cleaning products, Kerosene (lamp oil), Paint, Pesticides, Purses (which sometimes contain medicines), Plants, Medicines, including vitamins and birth control pills  Use safety caps on medicines.  Household  and polishes must be kept out of reach.   Store medicines and  out of sight.   Don't transfer medicines to non-child-proofed or unlabeled containers.  Dispose of unused medications.   Be especially careful when visiting older persons or families without children in the house. They may be in the habit of keeping their medicines out on tables.  Syrup of Ipecac is no longer recommended to be kept in the home. Please dispose of any remaining supplies.  Call the Poison Center at 1-902.619.3588 for any known or suspected poisoning.     LEAD EXPOSURE:  Tal will be more mobile in the next few months. If there is lead in the house, he may be vulnerable. Let us know if any of the following apply:  Your home was built before 1960 and has peeling or chipping or chalking paint. Homes built in older cities at the turn of the last century may have lead pipes.   Check to see if any of the above applies to Tla's sitter's home, , , or any other house where he spends time.  You may have other sources of lead in the home, including:                      - herbal remedies like fransico, geovanny, ghasard, kandu, azarcon, pay-loo-ah, or                        balagoli                     - antique  toys, especially those made of lead or pewter                    - imported mini blinds                    - imported canned goods, especially acid foods like tomato and citrus.         - Do not cook or store foods in utensils made of crystal, pewter, or imported pottery.  You have another child or housemate who is being followed or treated for an elevated lead level.  Tal lives with an adult whose job or hobby involves lead exposure (soldering, battery manufacture, recycling, casting, stained glass, etc.).  You live near an active lead smelter, a battery recycling plant, or a plant that processes hot metal.    CAR SAFETY:  An approved car seat in the back seat of the car is required by [place] law until Tal is four years old and weighs at least 40 pounds.  A rear-facing car seat in the back seat is the safest place for him.  This is especially true if your car is equipped with passenger-side air bags.  Forward-facing seats are not recommended until Tal is over two years of age. Your child will need a booster seat until 8 years and at least 80 pounds.    SMOKING:  Children exposed to tobacco smoke have more ear infections and pneumonia.  Cold symptoms last longer.  If you smoke, please quit.  If you cannot quit, smoke outside.  Do not smoke near Tal, and do not let others smoke near him.    SHOES:  Tiffs feet will develop fine whether they have shoes or not.  Children can run barefoot indoors or outdoors, as long as the surface is smooth and not hot.  Shoes provide safety, warmth, protection and, of course, decoration.  Choose shoes that have a roomy fit and flat, flexible soles with nonskid bottoms.    SLEEP:  The basic principles of good sleep are similar to those for adults:  Provide a consistent bedtime.  Provide a consistent time for the evening meal, preferably not immediately before bedtime.  Provide a reassuring routine (stories, prayers, songs etc.).  Provide a transition object.  This might be a  pillow, favorite blanket, or stuffed animal, but avoid providing transition objects for the mouth.  Don't allow objects on strings or that could be swallowed.  Provide a consistent waking time: this is important unless Tal is ill and needs extra sleep.  Allowing children who have slept poorly to \"sleep in\" delays meals and naps and gets them further off schedule.  You would do better to allow a longer nap.  Avoid stimulating activity before bedtime, such as TV and high-level physical activity.    SUN EXPOSURE:  If you plan to have Tal outside for more than 30 minutes, please follow the guidelines in our Sun Exposure handout.    TUBERCULOSIS:  There is such a low rate of tuberculosis in our area that frequent skin tests are no longer recommended. However, certain situations do increase Tal's risk, including contact with AIDS patients, nursing home residents, prisoners, immigrants, or homeless persons. Please let us know if Tal has contacts with such persons, or if he has contact with anyone known to have or suspected of having tuberculosis.    MEDICATION FOR FEVER OR PAIN:   Acetaminophen liquid (e.g., Tylenol or Tempra) may be given every four hours as needed for pain or fever.  Be sure to check which product CONCENTRATION you are using.    INFANT/CHILDREN’S Tylenol/Acetaminophen  (160 MG/5 mL)  Child’s Weight:            Dose:  12 - 17 pounds:           80 mg (2.5 mL  (1/2 Teaspoon))  18 - 23 pounds:           120 mg (3.75 mL (3/4 Teaspoon))      INFANT Ibuprofen (50 mg/1.25 ml) liquid (for example Advil or Motrin) may be given every 6 hours as needed for pain or fever.  Child’s Weight:            Dose:  12 - 17 pounds:           50 mg (1.25 mL)    18 - 23 pounds:           75 mg (1.875 mL)      CHILDREN'S Ibuprofen (100 mg/5 mL) liquid (for example Advil or Motrin) may be given every 6 hours as needed for pain or fever.  Child’s Weight:            Dose:  12 - 17 pounds:           50 mg (2.5 mL (1/2  Teaspoon))  18 - 23 pounds:           75 mg (3.75  mL (3/4 Teaspoon))  24 - 35 pounds           100 mg (5 mL (1 Teaspoon))    If Tal is outside these weight ranges, call your pediatrician's office for advice.         IMMUNIZATIONS:  Most Recent Immunizations   Administered Date(s) Administered    DTaP/Hep B/IPV 07/06/2023    Hep B, adolescent or pediatric 01/15/2023    Hib (PRP-OMP) 05/17/2023    Pneumococcal Conjugate 13 Valent Vacc (Prevnar 13) 07/06/2023    Rotavirus - pentavalent 07/06/2023   Pended Date(s) Pended    Hep A, ped/adol, 2 dose 01/04/2024    MMR 01/04/2024    Varicella 01/04/2024       If Tal develops any of the following reactions within 72 hours after an immunization, notify your pediatrician by calling the pediatric phone nurse:  A temperature of 105 degrees or above.  More than 3 hours of continuous crying.  A shrill, high-pitched cry.  A pale, limp spell.  A seizure or fainting spell.  In this case, you should call 911 or go immediately to the emergency room.      Bright Futures 1 Year Old Parent Education    Futuros Brillantes 1 año de edad (Educación para los padres) - Español    NEXT VISIT:  15 MONTHS OF AGE

## 2024-06-07 LAB
EKG ATRIAL RATE: 48 BPM
EKG P AXIS: 48 DEGREES
EKG P-R INTERVAL: 192 MS
EKG Q-T INTERVAL: 434 MS
EKG QRS DURATION: 92 MS
EKG QTC CALCULATION (BAZETT): 387 MS
EKG R AXIS: -28 DEGREES
EKG T AXIS: 0 DEGREES
EKG VENTRICULAR RATE: 48 BPM

## 2024-06-13 ENCOUNTER — HOSPITAL ENCOUNTER (EMERGENCY)
Age: 79
Discharge: HOME | End: 2024-06-13
Attending: EMERGENCY MEDICINE
Payer: MEDICARE

## 2024-06-13 VITALS
HEART RATE: 59 BPM | OXYGEN SATURATION: 99 % | DIASTOLIC BLOOD PRESSURE: 65 MMHG | HEIGHT: 69 IN | SYSTOLIC BLOOD PRESSURE: 162 MMHG | RESPIRATION RATE: 14 BRPM | WEIGHT: 185 LBS | TEMPERATURE: 98.2 F | BODY MASS INDEX: 27.4 KG/M2

## 2024-06-13 DIAGNOSIS — D64.9 NORMOCYTIC ANEMIA: Primary | ICD-10-CM

## 2024-06-13 LAB
ABO + RH BLD: NORMAL
ANION GAP SERPL CALCULATED.3IONS-SCNC: 13 MMOL/L (ref 9–17)
ARM BAND NUMBER: NORMAL
BACTERIA URNS QL MICRO: ABNORMAL
BASOPHILS # BLD: 0 K/UL (ref 0–0.2)
BASOPHILS NFR BLD: 1 % (ref 0–2)
BILIRUB UR QL STRIP: NEGATIVE
BLOOD BANK SAMPLE EXPIRATION: NORMAL
BLOOD GROUP ANTIBODIES SERPL: NEGATIVE
BUN SERPL-MCNC: 34 MG/DL (ref 8–23)
CALCIUM SERPL-MCNC: 10.3 MG/DL (ref 8.6–10.4)
CHLORIDE SERPL-SCNC: 107 MMOL/L (ref 98–107)
CLARITY UR: CLEAR
CO2 SERPL-SCNC: 23 MMOL/L (ref 20–31)
COLOR UR: YELLOW
CREAT SERPL-MCNC: 1.2 MG/DL (ref 0.5–0.9)
EOSINOPHIL # BLD: 0.2 K/UL (ref 0–0.4)
EOSINOPHILS RELATIVE PERCENT: 4 % (ref 1–4)
EPI CELLS #/AREA URNS HPF: ABNORMAL /HPF (ref 0–5)
ERYTHROCYTE [DISTWIDTH] IN BLOOD BY AUTOMATED COUNT: 14.2 % (ref 12.5–15.4)
GFR, ESTIMATED: 47 ML/MIN/1.73M2
GLUCOSE SERPL-MCNC: 105 MG/DL (ref 70–99)
GLUCOSE UR STRIP-MCNC: NEGATIVE MG/DL
HCT VFR BLD AUTO: 23.1 % (ref 36–46)
HGB BLD-MCNC: 7.5 G/DL (ref 12–16)
HGB UR QL STRIP.AUTO: NEGATIVE
KETONES UR STRIP-MCNC: NEGATIVE MG/DL
LEUKOCYTE ESTERASE UR QL STRIP: ABNORMAL
LYMPHOCYTES NFR BLD: 1.9 K/UL (ref 1–4.8)
LYMPHOCYTES RELATIVE PERCENT: 39 % (ref 24–44)
MCH RBC QN AUTO: 26.3 PG (ref 26–34)
MCHC RBC AUTO-ENTMCNC: 32.5 G/DL (ref 31–37)
MCV RBC AUTO: 80.9 FL (ref 80–100)
MONOCYTES NFR BLD: 0.6 K/UL (ref 0.1–1.2)
MONOCYTES NFR BLD: 13 % (ref 2–11)
NEUTROPHILS NFR BLD: 43 % (ref 36–66)
NEUTS SEG NFR BLD: 2.1 K/UL (ref 1.8–7.7)
NITRITE UR QL STRIP: NEGATIVE
PH UR STRIP: 6.5 [PH] (ref 5–8)
PLATELET # BLD AUTO: 268 K/UL (ref 140–450)
PMV BLD AUTO: 9.1 FL (ref 6–12)
POTASSIUM SERPL-SCNC: 3.9 MMOL/L (ref 3.7–5.3)
PROT UR STRIP-MCNC: NEGATIVE MG/DL
RBC # BLD AUTO: 2.85 M/UL (ref 4–5.2)
RBC #/AREA URNS HPF: ABNORMAL /HPF (ref 0–2)
SODIUM SERPL-SCNC: 143 MMOL/L (ref 135–144)
SP GR UR STRIP: 1.01 (ref 1–1.03)
UROBILINOGEN UR STRIP-ACNC: NORMAL EU/DL (ref 0–1)
WBC #/AREA URNS HPF: ABNORMAL /HPF (ref 0–5)
WBC OTHER # BLD: 4.8 K/UL (ref 3.5–11)

## 2024-06-13 PROCEDURE — 85025 COMPLETE CBC W/AUTO DIFF WBC: CPT

## 2024-06-13 PROCEDURE — 81001 URINALYSIS AUTO W/SCOPE: CPT

## 2024-06-13 PROCEDURE — 87086 URINE CULTURE/COLONY COUNT: CPT

## 2024-06-13 PROCEDURE — 86901 BLOOD TYPING SEROLOGIC RH(D): CPT

## 2024-06-13 PROCEDURE — 99283 EMERGENCY DEPT VISIT LOW MDM: CPT

## 2024-06-13 PROCEDURE — 86900 BLOOD TYPING SEROLOGIC ABO: CPT

## 2024-06-13 PROCEDURE — 80048 BASIC METABOLIC PNL TOTAL CA: CPT

## 2024-06-13 PROCEDURE — 36415 COLL VENOUS BLD VENIPUNCTURE: CPT

## 2024-06-13 PROCEDURE — 86850 RBC ANTIBODY SCREEN: CPT

## 2024-06-13 ASSESSMENT — ENCOUNTER SYMPTOMS
BLOOD IN STOOL: 0
VOMITING: 0
NAUSEA: 0
ANAL BLEEDING: 0
DIARRHEA: 0
ABDOMINAL PAIN: 0

## 2024-06-13 ASSESSMENT — PAIN - FUNCTIONAL ASSESSMENT: PAIN_FUNCTIONAL_ASSESSMENT: NONE - DENIES PAIN

## 2024-06-13 NOTE — DISCHARGE INSTRUCTIONS
You do have anemia, today her hemoglobin was 7.5.  When you already had your labs drawn earlier today it was 7.8.  The average female should have a hemoglobin that is at least 12.  Your family doctor did order additional tests including iron, B12, folate studies.  However these will take a little bit to come back.  In the meantime continue taking your iron pills.  You also have mild evidence of kidney dysfunction based on your creatinine, however it is still at the same level that it has been in the past.  This again should be followed up with your primary care doctor.  You are rectal exam today was negative for any blood.

## 2024-06-13 NOTE — ED PROVIDER NOTES
Abdominal:      General: Abdomen is flat. There is no distension.      Palpations: Abdomen is soft.      Tenderness: There is no abdominal tenderness. There is no guarding or rebound.   Genitourinary:     Rectum: Guaiac result negative. No mass, tenderness, anal fissure, external hemorrhoid or internal hemorrhoid. Normal anal tone.   Musculoskeletal:         General: Normal range of motion.      Cervical back: Normal range of motion.   Skin:     General: Skin is warm and dry.      Coloration: Skin is not pale.   Neurological:      General: No focal deficit present.      Mental Status: She is alert.   Psychiatric:         Behavior: Behavior normal.         DIFFERENTIAL  DIAGNOSIS   PLAN (LABS / IMAGING / EKG):  Orders Placed This Encounter   Procedures    Culture, Urine    Urinalysis with Microscopic    CBC with Auto Differential    Basic Metabolic Panel    Blood Occult Stool Screen #1    TYPE AND SCREEN       MEDICATIONS ORDERED:  No orders of the defined types were placed in this encounter.      DIAGNOSTIC RESULTS / EMERGENCYDEPARTMENT COURSE / MDM   LABS:  Labs Reviewed   URINALYSIS WITH MICROSCOPIC - Abnormal; Notable for the following components:       Result Value    Leukocyte Esterase, Urine TRACE (*)     Bacteria, UA FEW (*)     All other components within normal limits   CBC WITH AUTO DIFFERENTIAL - Abnormal; Notable for the following components:    RBC 2.85 (*)     Hemoglobin 7.5 (*)     Hematocrit 23.1 (*)     Monocytes % 13 (*)     All other components within normal limits   BASIC METABOLIC PANEL - Abnormal; Notable for the following components:    Glucose 105 (*)     BUN 34 (*)     Creatinine 1.2 (*)     Est, Glom Filt Rate 47 (*)     All other components within normal limits   CULTURE, URINE   BLOOD OCCULT STOOL SCREEN #1   TYPE AND SCREEN       RADIOLOGY:  No results found.    EMERGENCY DEPARTMENT COURSE:  ED Course as of 06/13/24 1639   Thu Jun 13, 2024   1500 Labs drawn outpatient at [facility]  history.  I discussed discharge with patient which she was comfortable with.  Return precautions were discussed in length.  She states her next appointment with her PCP is at the beginning of July.       Amount and/or Complexity of Data Reviewed  Clinical lab tests: ordered and reviewed  Review and summarize past medical records: yes    Patient Progress  Patient progress: stable        PROCEDURES:  Procedures     CONSULTS:  None    CRITICAL CARE:  NONE    FINAL IMPRESSION     1. Normocytic anemia        DISPOSITION / PLAN   DISPOSITION Decision To Discharge 06/13/2024 04:30:27 PM      Evaluation and treatment course in the ED including any consultations, as well as plan of care upon discharge was discussed in length with the patient.  Patient had no further questions prior to being discharged and was instructed to return to the ED for new or worsening symptoms.  Any changes to existing medications or new prescriptions were reviewed with patient, and they expressed understanding of how to correctly take their medications and the possible side effects.    PATIENT REFERRED TO:  Amaya Mcmillan MD  [address] [facility] [address]  [phone]          [facility] Emergency Department  [address] [facility]  [facility] [id number] [phone]    As needed, If symptoms worsen      DISCHARGE MEDICATIONS:  New Prescriptions    No medications on file       Hannah Garcias DO  Emergency Medicine Physician    (Please note that portions of this note were completed with a voice recognition program.  Efforts were made to edit the dictations but occasionally words are mis-transcribed.)        Hannah Garcias DO  06/13/24 0721

## 2024-06-14 LAB
MICROORGANISM SPEC CULT: NO GROWTH
SPECIMEN DESCRIPTION: NORMAL

## 2025-03-14 ENCOUNTER — HOSPITAL ENCOUNTER (OUTPATIENT)
Age: 80
Discharge: HOME | End: 2025-03-16
Payer: MEDICARE

## 2025-03-14 ENCOUNTER — TELEPHONE (OUTPATIENT)
Age: 80
End: 2025-03-14

## 2025-03-14 DIAGNOSIS — M25.552 LEFT HIP PAIN: ICD-10-CM

## 2025-03-14 DIAGNOSIS — M25.552 LEFT HIP PAIN: Primary | ICD-10-CM

## 2025-03-14 PROCEDURE — 73502 X-RAY EXAM HIP UNI 2-3 VIEWS: CPT

## 2025-03-14 NOTE — TELEPHONE ENCOUNTER
Phoned patient in regards to getting x-ray taken prior to appointment with provider. Instructed to get x-ray within an hour prior to appointment at: [address]. -- The sign by the road states \"Lab and imaging services\". Order for x-ray has already been placed in system.

## 2025-03-18 ENCOUNTER — OFFICE VISIT (OUTPATIENT)
Age: 80
End: 2025-03-18
Payer: MEDICARE

## 2025-03-18 VITALS — HEIGHT: 69 IN | BODY MASS INDEX: 28.44 KG/M2 | WEIGHT: 192 LBS

## 2025-03-18 DIAGNOSIS — M70.72 BURSITIS OF LEFT HIP, UNSPECIFIED BURSA: Primary | ICD-10-CM

## 2025-03-18 PROCEDURE — 99204 OFFICE O/P NEW MOD 45 MIN: CPT | Performed by: ORTHOPAEDIC SURGERY

## 2025-03-18 PROCEDURE — 1125F AMNT PAIN NOTED PAIN PRSNT: CPT | Performed by: ORTHOPAEDIC SURGERY

## 2025-03-18 PROCEDURE — 1123F ACP DISCUSS/DSCN MKR DOCD: CPT | Performed by: ORTHOPAEDIC SURGERY

## 2025-03-18 NOTE — PROGRESS NOTES
[facility] Orthopedics & Sports Medicine      [facility] PHYSICIANS [facility] ORTHOPAEDICS AND SPORTS MEDICINE  [address] [place] [address] [address]  [place] [address]  Dept: [phone]  Dept Fax: [phone]    Chief Compliant:  Chief Complaint   Patient presents with    Hip Pain     Left hip pain        History of Present Illness:  This is a 79 y.o. female who presents to the clinic today for evaluation of Left hip pain going on for 2-3 months, 2-3/10, describes as dull ache present most of the time, lateral aspect only, no pain anterior groin or posterior aspect,  worse with walking and activity, improves with rest, denies any pain with sitting. Denies any weakness,numbness, tingling, loss of sensation, denies any trauma, prior fracture/surgery, she is using cane to walk just for stability support, but can walk without it as well. OTC analgesics provide some relief, has never tried PT for this hip pain      Physical Exam:    On exam there is no swelling on palpation, she does endorse tenderness on palpation lateral region hip, log roll test negative, ROM is good, no pain with hip flexion, extension, she was asked to walk in clinic without her cane and reports no pain, just discomfort in lateral aspect left hip    Nursing note and vitals reviewed.     Labs and Imaging:   X-rays show no acute process.  Still preserved joint space.  XR taken today:  No results found.        Orders Placed This Encounter   Procedures    Ambulatory referral to Physical Therapy     Referral Priority:   Routine     Referral Type:   Eval and Treat     Referral Reason:   Specialty Services Required     Requested Specialty:   Physical Therapy     Number of Visits Requested:   1       Assessment and Plan:  1. Bursitis of left hip, unspecified bursa          This is a 79 y.o. female with Left hip bursitis. Discussed management with analgesics, steroid injections and physical therapy. Patient reports no significant

## 2025-03-21 ENCOUNTER — HOSPITAL ENCOUNTER (OUTPATIENT)
Age: 80
Setting detail: THERAPIES SERIES
Discharge: HOME | End: 2025-03-21
Attending: ORTHOPAEDIC SURGERY
Payer: MEDICARE

## 2025-03-21 PROCEDURE — 97161 PT EVAL LOW COMPLEX 20 MIN: CPT

## 2025-03-21 PROCEDURE — 97110 THERAPEUTIC EXERCISES: CPT

## 2025-03-21 NOTE — CONSULTS
[x] [facility]  Outpatient Physical Therapy  [address] [facility]  Phone: [phone]  Fax: [phone]       Physical Therapy Evaluation    Date:  3/21/2025  Patient: Stella Courtney  : 1945  MRN: [id number]  Physician: Dr. Harper   Insurance: [place] [employer] MEDICARE   Medical Diagnosis: Bursitis Left Hip    Rehab Codes: ***  Onset Date: ***                                 Next 's appt: ***    Subjective:   HPI:     PMHx/Comorbidities:  [] refer to full medical chart in [facility] [] Unremarkable    [] Pacemaker [] Cancer [] Arthritis   [] MI/Heart Problems [] Diabetes [] HTN   [] Obesity [] Dialysis  [] Other:   [] Asthma/COPD [] Dementia [] Other:   [] Stroke [] Sleep apnea [] Other:   [] Vascular disease [] Rheumatic disease [] Other:     Medications: [x] Refer to full medical record [] None [] Other:  Allergies:      [x] Refer to full medical record  [] None [] Other:    Tests: [] X-Ray: [] MRI:  [] Other:    Function:    Patient lives with:    In what type of home []  One story   [] Two story   [] Split level   Number of stairs to enter    With handrail on the []  Right to enter   [] Left to enter   Bathroom has a []  Tub only  [] Tub/shower combo   [] Walk in shower    []  Grab bars   Washing machine is on []  Main level   [] Second level   [] Basement     ADL/IADL [x] Previously independent with all [x] Currently independent with all Who currently assists the patient with task    [] Previously independent with all except: [] Currently independent with all except:    Bathing  [] Assist [] Assist    Dress/grooming [] Assist [] Assist    Transfer/mobility [] Assist [] Assist    Feeding [] Assist [] Assist    Toileting [] Assist [] Assist    Driving [] Assist [] Assist    Housekeeping [] Assist [] Assist    Grocery shop/meal prep [] Assist [] Assist      Gait Prior level of function Current level of function    [x] Independent  [] Assist [x] Independent  [] Assist   Device: [] Independent [] Independent

## 2025-03-26 ENCOUNTER — HOSPITAL ENCOUNTER (OUTPATIENT)
Age: 80
Setting detail: THERAPIES SERIES
Discharge: HOME | End: 2025-03-26
Attending: ORTHOPAEDIC SURGERY
Payer: MEDICARE

## 2025-03-26 PROCEDURE — 97140 MANUAL THERAPY 1/> REGIONS: CPT

## 2025-03-26 PROCEDURE — 97110 THERAPEUTIC EXERCISES: CPT

## 2025-03-26 NOTE — FLOWSHEET NOTE
[x] [facility]  Outpatient Rehabilitation &  Therapy  [address] [place] [address]  [phone]  F: [phone]             Physical Therapy Daily Treatment Note    Date:  3/26/2025  Patient Name:  Stella Courtney    :  1945  MRN: [id number]  Physician: Dr. Harper                                   Insurance: [facility] MEDICARE  Approval For 5 Visits Received Through [facility] From 2025 Through May 19, 2025  Medical Diagnosis: Bursitis Left Hip               Rehab Codes: M70.72  M25.552  M25.652  M62.81  R26.2   Onset Date: 2025                                 Next 's appt: None Scheduled  Visit# / total visits: ; Progress note for patient due at visit 06     Cancels/No Shows: 0/0    Subjective:  Pt reports no adverse response to previous Therapy session. She states she did awaken with some increased Low Back pain. She states she is having a bad day today with increased Bilat Low Back pain and Left Lateral Thigh pain. She states performing Reverse Clam increased her Left Hip pain. She states walking and putting pressure on her Left LE increases Left Thigh pain.  Pain:  [x] Yes  [] No Location: Left Hip And Thigh   Pain Rating: (0-10 scale) 6/10  Pain altered Tx:  [x] No  [] Yes  Action: None    Objective:  Pt presents ambulating with a St Cane in her Right Hand without deviation noted. Ambulating without the St Cane, pt demonstrates Compensated Trendelenburg Gait Left.  Standing Posture: Right [place] and Scapula Elevated. Kyphosis with Rotation Right.  Trunk AROM: Flexion - Finger Tips to Toes with no effect, Extension 50% with no effect, Lateral Flexion Right 25% and Left 50% with no effect, Rotation 25% Bilat with no effect.  Single Leg Heel Raises: 5 Reps with Left weaker per pt.  DTR's: L4 1+ Bilat              S1 1+ Bilat  Sensation to Light Touch intact Bilat LE.  Slump Test (-)  SLR (-)    LE Strength: Hip Flexors Right 4-/5 (17.6 lbs) and Left 4-/5 (22.3 lbs), Hip IR

## 2025-03-28 ENCOUNTER — HOSPITAL ENCOUNTER (OUTPATIENT)
Age: 80
Setting detail: THERAPIES SERIES
Discharge: HOME | End: 2025-03-28
Attending: ORTHOPAEDIC SURGERY
Payer: MEDICARE

## 2025-03-28 PROCEDURE — 97140 MANUAL THERAPY 1/> REGIONS: CPT

## 2025-03-28 PROCEDURE — 97110 THERAPEUTIC EXERCISES: CPT

## 2025-03-31 ENCOUNTER — HOSPITAL ENCOUNTER (OUTPATIENT)
Age: 80
Setting detail: THERAPIES SERIES
Discharge: HOME | End: 2025-03-31
Attending: ORTHOPAEDIC SURGERY
Payer: MEDICARE

## 2025-03-31 PROCEDURE — 97140 MANUAL THERAPY 1/> REGIONS: CPT

## 2025-03-31 PROCEDURE — 97110 THERAPEUTIC EXERCISES: CPT

## 2025-03-31 NOTE — FLOWSHEET NOTE
[x] [facility]  Outpatient Rehabilitation &  Therapy  [address] [place] [address]  [phone]  F: [phone]             Physical Therapy Daily Treatment Note    Date:  3/31/2025  Patient Name:  Stella Courtney    :  1945  MRN: [id number]  Physician: Dr. Harper                                   Insurance: [facility] MEDICARE  Approval For 5 Visits Received Through [facility] From 2025 Through May 19, 2025  Medical Diagnosis: Bursitis Left Hip               Rehab Codes: M70.72  M25.552  M25.652  M62.81  R26.2   Onset Date: 2025                                 Next 's appt: None Scheduled  Visit# / total visits: ; Progress note for patient due at visit 06     Cancels/No Shows: 0/0    Subjective:  Pt reports she was a little sore in her Groin after her previous Therapy session. She states she performed the GOLDEN over the weekend without effect. She states the Reverse Clam produces Left Thigh pain. She states the Low Back pain seems to come and go without provocation. She states the Left Thigh pain presents mostly with walking. She states laying on her Left Side provokes her symptoms.   Pain:  [x] Yes  [] No Location: Left Hip And Thigh   Pain Rating: (0-10 scale) 3/10  Pain altered Tx:  [x] No  [] Yes  Action: None    Objective:  Pt presents ambulating with a St Cane in her Right Hand without deviation noted. Ambulating without the St Cane, pt demonstrates Compensated Trendelenburg Gait Left.  Standing Posture: Right Ilium and Scapula Elevated. Kyphosis with Rotation Right.  Trunk AROM: Flexion - Finger Tips to Toes with no effect, Extension 50% with no effect, Lateral Flexion Right 25% and Left 50% with no effect, Rotation 25% Bilat with no effect.  Single Leg Heel Raises: 5 Reps with Left weaker per pt.  DTR's: L4 1+ Bilat              S1 1+ Bilat  Sensation to Light Touch intact Bilat LE.  Slump Test (-)  SLR (-)    LE Strength: Hip Flexors Right 4-/5 (17.6 lbs) and Left 4-/5

## 2025-04-03 ENCOUNTER — HOSPITAL ENCOUNTER (OUTPATIENT)
Age: 80
Setting detail: THERAPIES SERIES
Discharge: HOME | End: 2025-04-03
Attending: ORTHOPAEDIC SURGERY
Payer: MEDICARE

## 2025-04-03 PROCEDURE — 97140 MANUAL THERAPY 1/> REGIONS: CPT

## 2025-04-03 PROCEDURE — 97110 THERAPEUTIC EXERCISES: CPT

## 2025-04-03 NOTE — FLOWSHEET NOTE
[x] [facility]  Outpatient Rehabilitation &  Therapy  [address] [place] [address]  [phone]  F: [phone]             Physical Therapy Daily Treatment Note    Date:  4/3/2025  Patient Name:  Stella Courtney    :  1945  MRN: [id number]  Physician: Dr. Harper                                   Insurance: [facility] MEDICARE  Approval For 5 Visits Received Through [facility] From 2025 Through May 19, 2025  Medical Diagnosis: Bursitis Left Hip               Rehab Codes: M70.72  M25.552  M25.652  M62.81  R26.2   Onset Date: 2025                                 Next 's appt: None Scheduled  Visit# / total visits: ; Progress note for patient due at visit 06     Cancels/No Shows: 0/0    Subjective:  Pt reports no adverse response to previous Therapy session. She states she continues to have constant discomfort in her Left Thigh. She states walking increases her Left Thigh pain. She states walking with a St Cane improves her Left Thigh pain. She states the Reverse Clam produces Left Thigh pain. She states the Low Back pain seems to come and go without provocation.   Pain:  [x] Yes  [] No Location: Left Hip And Thigh   Pain Rating: (0-10 scale) 3/10  Pain altered Tx:  [x] No  [] Yes  Action: None    Objective:  Pt presents ambulating with a St Cane in her Right Hand without deviation noted. Ambulating without the St Cane, pt demonstrates Compensated Trendelenburg Gait Left.  Standing Posture: Right Ilium and Scapula Elevated. Kyphosis with Rotation Right.  Trunk AROM: Flexion - Finger Tips to Toes with no effect, Extension 50% with no effect, Lateral Flexion Right 25% and Left 50% with no effect, Rotation 25% Bilat with no effect.  Single Leg Heel Raises: 5 Reps with Left weaker per pt.  DTR's: L4 1+ Bilat              S1 1+ Bilat  Sensation to Light Touch intact Bilat LE.  Slump Test (-)  SLR (-)    LE Strength: Hip Flexors Right 4-/5 (17.6 lbs) and Left 4-/5 (22.3 lbs), Hip IR

## 2025-04-17 ENCOUNTER — APPOINTMENT (OUTPATIENT)
Age: 80
End: 2025-04-17
Attending: ORTHOPAEDIC SURGERY
Payer: MEDICARE

## 2025-05-02 ENCOUNTER — HOSPITAL ENCOUNTER (OUTPATIENT)
Age: 80
Setting detail: THERAPIES SERIES
Discharge: HOME | End: 2025-05-02
Attending: ORTHOPAEDIC SURGERY

## 2025-05-02 NOTE — FLOWSHEET NOTE
[x] [facility]  Outpatient Rehabilitation &  Therapy  [address]  [phone]  F: [phone]             Physical Therapy Discharge Note    Date:  2025  Patient Name:  Stella Courtney    :  1945  MRN: [id number]  Physician: Dr. Harper                                   Insurance: [facility] MEDICARE  Approval For 5 Visits Received Through [facility] From 2025 Through May 19, 2025  Medical Diagnosis: Bursitis Left Hip               Rehab Codes: M70.72  M25.552  M25.652  M62.81  R26.2   Onset Date: 2025                                   Visit# / total visits:     Cancels/No Shows: 1/0    Objective:  Pt presents ambulating with a St Cane in her Right Hand without deviation noted. Ambulating without the St Cane, pt demonstrates Compensated Trendelenburg Gait Left.  Standing Posture: Right Ilium and Scapula Elevated. Kyphosis with Rotation Right.  Trunk AROM: Flexion - Finger Tips to Toes with no effect, Extension 50% with no effect, Lateral Flexion Right 25% and Left 50% with no effect, Rotation 25% Bilat with no effect.  Single Leg Heel Raises: 5 Reps with Left weaker per pt.  DTR's: L4 1+ Bilat              S1 1+ Bilat  Sensation to Light Touch intact Bilat LE.  Slump Test (-)  SLR (-)    LE Strength: Hip Flexors Right 4-/5 (17.6 lbs) and Left 4-/5 (22.3 lbs), Hip IR Right 4-/5 (17.5 lbs) and Left 4-/5 (14.2 lbs), ER Right 4-/5 (12.6 lbs) and Left 4-/5 (15.1 lbs), Clam Right 4-/5 (23.1 lbs) and Left 4-/5 (21.0 lbs) with Hip pain, Hip IR in Side Lying Right 4-/5 (12.6 lbs) and Left 3+/5 (6.6 lbs) with Lateral Thigh pain and Hip Abduction in Side Lying Right 4-/5 (12.2 lbs) and Left 3+/5 (3.7 lbs) with Left hip pain.  Left Hip PROM: WFL except ER 35 degrees with increased pain vs Right Hip ER 60 degrees.   Left Hip Scour (-)  WOMAC Score: 15/96  Above recorded at Initial Evaluation on 2025.      Assessment: Pt canceled her last remaining appointment and

## 2025-05-26 ENCOUNTER — HOSPITAL ENCOUNTER (EMERGENCY)
Age: 80
Discharge: HOME | End: 2025-05-26
Attending: EMERGENCY MEDICINE
Payer: MEDICARE

## 2025-05-26 VITALS
OXYGEN SATURATION: 100 % | HEIGHT: 69 IN | SYSTOLIC BLOOD PRESSURE: 140 MMHG | BODY MASS INDEX: 28.44 KG/M2 | WEIGHT: 192 LBS | DIASTOLIC BLOOD PRESSURE: 57 MMHG | TEMPERATURE: 97.9 F | HEART RATE: 54 BPM | RESPIRATION RATE: 16 BRPM

## 2025-05-26 DIAGNOSIS — N39.0 URINARY TRACT INFECTION WITHOUT HEMATURIA, SITE UNSPECIFIED: Primary | ICD-10-CM

## 2025-05-26 LAB
BACTERIA URNS QL MICRO: ABNORMAL
BILIRUB UR QL STRIP: NEGATIVE
CLARITY UR: ABNORMAL
COLOR UR: YELLOW
EPI CELLS #/AREA URNS HPF: ABNORMAL /HPF (ref 0–5)
GLUCOSE UR STRIP-MCNC: NEGATIVE MG/DL
HGB UR QL STRIP.AUTO: ABNORMAL
KETONES UR STRIP-MCNC: NEGATIVE MG/DL
LEUKOCYTE ESTERASE UR QL STRIP: ABNORMAL
NITRITE UR QL STRIP: NEGATIVE
PH UR STRIP: 6 [PH] (ref 5–8)
PROT UR STRIP-MCNC: ABNORMAL MG/DL
RBC #/AREA URNS HPF: ABNORMAL /HPF (ref 0–2)
SP GR UR STRIP: 1.01 (ref 1–1.03)
UROBILINOGEN UR STRIP-ACNC: NORMAL EU/DL (ref 0–1)
WBC #/AREA URNS HPF: ABNORMAL /HPF (ref 0–5)

## 2025-05-26 PROCEDURE — 99283 EMERGENCY DEPT VISIT LOW MDM: CPT

## 2025-05-26 PROCEDURE — 81001 URINALYSIS AUTO W/SCOPE: CPT

## 2025-05-26 PROCEDURE — 87088 URINE BACTERIA CULTURE: CPT

## 2025-05-26 PROCEDURE — 6370000000 HC RX 637 (ALT 250 FOR IP): Performed by: NURSE PRACTITIONER

## 2025-05-26 PROCEDURE — 87186 SC STD MICRODIL/AGAR DIL: CPT

## 2025-05-26 PROCEDURE — 87086 URINE CULTURE/COLONY COUNT: CPT

## 2025-05-26 RX ORDER — CEFUROXIME AXETIL 250 MG/1
500 TABLET ORAL ONCE
Status: COMPLETED | OUTPATIENT
Start: 2025-05-26 | End: 2025-05-26

## 2025-05-26 RX ORDER — CEFUROXIME AXETIL 250 MG/1
250 TABLET ORAL 2 TIMES DAILY
Qty: 14 TABLET | Refills: 0 | Status: SHIPPED | OUTPATIENT
Start: 2025-05-26 | End: 2025-06-02

## 2025-05-26 RX ADMIN — CEFUROXIME AXETIL 500 MG: 250 TABLET, FILM COATED ORAL at 12:40

## 2025-05-26 ASSESSMENT — PAIN - FUNCTIONAL ASSESSMENT
PAIN_FUNCTIONAL_ASSESSMENT: NONE - DENIES PAIN
PAIN_FUNCTIONAL_ASSESSMENT: NONE - DENIES PAIN

## 2025-05-26 NOTE — ED PROVIDER NOTES
[facility] Emergency Department  [address]  [facility] [address]  Phone: [phone]  Fax: [phone]      Pt Name: Stella Courtney  MRN: [id number]  Birthdate 1945  Date of evaluation: 5/26/25    CHIEF COMPLAINT       Chief Complaint   Patient presents with    UTI     Cloudy, malodorous urine X 3 days; headache, low back pain       PAST MEDICAL HISTORY    has a past medical history of Hyperlipidemia and UTI (urinary tract infection).    SURGICAL HISTORY      has a past surgical history that includes Hysterectomy, total abdominal (1989); Breast lumpectomy (2017); Total knee arthroplasty (12/2017); and Cataract removal.    CURRENT MEDICATIONS       Previous Medications    AMLODIPINE (NORVASC) 2.5 MG TABLET    Take 1 tablet by mouth daily    ANASTROZOLE (ARIMIDEX) 1 MG TABLET    Take 1 tablet by mouth daily    ATORVASTATIN (LIPITOR) 40 MG TABLET    Take 1 tablet by mouth daily    COMBIGAN 0.2-0.5 % OPHTHALMIC SOLUTION    Place 1 drop into both eyes    FENOFIBRATE (TRICOR) 145 MG TABLET    Take 1 tablet by mouth daily    LISINOPRIL (PRINIVIL;ZESTRIL) 5 MG TABLET    Take 1 tablet by mouth daily    LUMIGAN 0.01 % SOLN OPHTHALMIC DROPS    Place into both eyes nightly     MULTIPLE VITAMINS-MINERALS (ICAPS) TABS    Take by mouth daily       ALLERGIES     is allergic to nitrofurantoin and penicillins.    Vitals:    05/26/25 1111   BP: (!) 140/57   Pulse: 54   Resp: 16   Temp: 97.9 °F (36.6 °C)   TempSrc: Oral   SpO2: 100%   Weight: 87.1 kg (192 lb)   Height: 1.753 m (5' 9\")            FINAL IMPRESSION      1. Urinary tract infection without hematuria, site unspecified          DISPOSITION/PLAN   DISPOSITION Decision To Discharge 05/26/2025 11:58:25 AM   DISPOSITION CONDITION Stable             PATIENT REFERRED TO:  Amaya Mcmillan MD  [address]  [facility] [address]  [phone]          [facility] Emergency Department  [address] [address]

## 2025-05-26 NOTE — DISCHARGE INSTRUCTIONS
We evaluated you today for a urinary tract infection.  There is history of resistance to a lot of the antibiotics.  Please start cefuroxime.  If you get worse, noticed fevers or abdominal pain, confusion, vomiting, body aches or any other concerns please return immediately to the ER.  Follow closely with your urologist

## 2025-05-26 NOTE — ED PROVIDER NOTES
[facility] EMERGENCY DEPARTMENT  EMERGENCY DEPARTMENT ENCOUNTER      Pt Name: Stella Courtney  MRN: [id number]  Birthdate 1945  Date of evaluation: 5/26/2025  Provider: GAGAN Milligan CNP  12:24 PM    CHIEF COMPLAINT       Chief Complaint   Patient presents with    UTI     Cloudy, malodorous urine X 3 days; headache, low back pain         HISTORY OF PRESENT ILLNESS    Stella Courtney is a 79 y.o. female who presents to the emergency department for evaluation of cloudy foul-smelling urine for the past 3 days.  No abdominal pain no back pain no fevers no nausea no vomiting no confusion.  She has a history of recurrent UTIs.  States that occasionally she requires IV antibiotics.  She has no dysuria no frequency no blood in the urine.  Follows with urology but has not had to see them in about a year.    HPI    Nursing Notes were reviewed.    REVIEW OF SYSTEMS       Review of Systems   All other systems reviewed and are negative.      Except as noted above the remainder of the review of systems was reviewed and negative.       PAST MEDICAL HISTORY     Past Medical History:   Diagnosis Date    Hyperlipidemia     UTI (urinary tract infection)     frequent         SURGICAL HISTORY       Past Surgical History:   Procedure Laterality Date    BREAST LUMPECTOMY  2017    CATARACT REMOVAL      HYSTERECTOMY, TOTAL ABDOMINAL (CERVIX REMOVED)  1989    TOTAL KNEE ARTHROPLASTY  12/2017         CURRENT MEDICATIONS       Previous Medications    AMLODIPINE (NORVASC) 2.5 MG TABLET    Take 1 tablet by mouth daily    ANASTROZOLE (ARIMIDEX) 1 MG TABLET    Take 1 tablet by mouth daily    ATORVASTATIN (LIPITOR) 40 MG TABLET    Take 1 tablet by mouth daily    COMBIGAN 0.2-0.5 % OPHTHALMIC SOLUTION    Place 1 drop into both eyes    FENOFIBRATE (TRICOR) 145 MG TABLET    Take 1 tablet by mouth daily    LISINOPRIL (PRINIVIL;ZESTRIL) 5 MG TABLET    Take 1 tablet by mouth daily    LUMIGAN 0.01 % SOLN OPHTHALMIC DROPS    Place into

## 2025-05-28 ENCOUNTER — RESULTS FOLLOW-UP (OUTPATIENT)
Dept: EMERGENCY DEPT | Age: 80
End: 2025-05-28

## 2025-05-28 LAB
MICROORGANISM SPEC CULT: ABNORMAL
SERVICE CMNT-IMP: ABNORMAL
SPECIMEN DESCRIPTION: ABNORMAL

## 2025-07-02 ENCOUNTER — HOSPITAL ENCOUNTER (EMERGENCY)
Age: 80
Discharge: HOME | End: 2025-07-02
Attending: STUDENT IN AN ORGANIZED HEALTH CARE EDUCATION/TRAINING PROGRAM
Payer: MEDICARE

## 2025-07-02 VITALS
DIASTOLIC BLOOD PRESSURE: 64 MMHG | RESPIRATION RATE: 18 BRPM | WEIGHT: 182.98 LBS | HEIGHT: 69 IN | TEMPERATURE: 98 F | OXYGEN SATURATION: 100 % | BODY MASS INDEX: 27.1 KG/M2 | HEART RATE: 52 BPM | SYSTOLIC BLOOD PRESSURE: 180 MMHG

## 2025-07-02 DIAGNOSIS — N30.00 ACUTE CYSTITIS WITHOUT HEMATURIA: Primary | ICD-10-CM

## 2025-07-02 LAB
BACTERIA URNS QL MICRO: ABNORMAL
BILIRUB UR QL STRIP: NEGATIVE
CHARACTER UR: ABNORMAL
CLARITY UR: CLEAR
COLOR UR: YELLOW
EPI CELLS #/AREA URNS HPF: ABNORMAL /HPF (ref 0–5)
GLUCOSE UR STRIP-MCNC: NEGATIVE MG/DL
HGB UR QL STRIP.AUTO: NEGATIVE
KETONES UR STRIP-MCNC: NEGATIVE MG/DL
LEUKOCYTE ESTERASE UR QL STRIP: ABNORMAL
NITRITE UR QL STRIP: NEGATIVE
PH UR STRIP: 7.5 [PH] (ref 5–8)
PROT UR STRIP-MCNC: NEGATIVE MG/DL
RBC #/AREA URNS HPF: ABNORMAL /HPF (ref 0–2)
SP GR UR STRIP: 1.01 (ref 1–1.03)
UROBILINOGEN UR STRIP-ACNC: NORMAL EU/DL (ref 0–1)
WBC #/AREA URNS HPF: ABNORMAL /HPF (ref 0–5)

## 2025-07-02 PROCEDURE — 81001 URINALYSIS AUTO W/SCOPE: CPT

## 2025-07-02 PROCEDURE — 99283 EMERGENCY DEPT VISIT LOW MDM: CPT

## 2025-07-02 PROCEDURE — 87086 URINE CULTURE/COLONY COUNT: CPT

## 2025-07-02 PROCEDURE — 6370000000 HC RX 637 (ALT 250 FOR IP): Performed by: NURSE PRACTITIONER

## 2025-07-02 RX ORDER — CEFUROXIME AXETIL 250 MG/1
250 TABLET ORAL 2 TIMES DAILY
Qty: 14 TABLET | Refills: 0 | Status: SHIPPED | OUTPATIENT
Start: 2025-07-02 | End: 2025-07-09

## 2025-07-02 RX ORDER — CEFUROXIME AXETIL 250 MG/1
500 TABLET ORAL EVERY 12 HOURS SCHEDULED
Status: DISCONTINUED | OUTPATIENT
Start: 2025-07-02 | End: 2025-07-02 | Stop reason: HOSPADM

## 2025-07-02 RX ADMIN — CEFUROXIME AXETIL 500 MG: 250 TABLET, FILM COATED ORAL at 11:27

## 2025-07-02 ASSESSMENT — ENCOUNTER SYMPTOMS
NAUSEA: 0
SHORTNESS OF BREATH: 0
COUGH: 0
ABDOMINAL PAIN: 0
BACK PAIN: 1
VOMITING: 0
SORE THROAT: 0
DIARRHEA: 0

## 2025-07-02 ASSESSMENT — PAIN - FUNCTIONAL ASSESSMENT: PAIN_FUNCTIONAL_ASSESSMENT: NONE - DENIES PAIN

## 2025-07-02 NOTE — ED PROVIDER NOTES
[facility] EMERGENCY DEPARTMENT  EMERGENCY DEPARTMENT ENCOUNTER      Pt Name: Stella Courtney  MRN: [id number]  Birthdate 1945  Date of evaluation: 7/2/2025  Provider: GAGAN Walker CNP  9:51 AM    CHIEF COMPLAINT       Chief Complaint   Patient presents with   • Dysuria     Low back pains.  Cloudy urine with curious smell.  Onset 1 day ago. History of UTI.  (Pt has used Cefuroxime previously and with good results)         HISTORY OF PRESENT ILLNESS    Stella Courtney is a 79 y.o. female who presents to the emergency department      This is a nontoxic-appearing 79-year-old female presenting to the emergency department via private auto, she reports noticing an aching pain in the low back 3 days ago, states that she sometimes has this with urine infections, she has not noticed any dysuria hematuria or frequency, states last night she started with frequency restless through the night, this morning had cloudy urine was concern for possible urinary infection prompting the emergency room visit, she has had no fevers with this no abdominal pain no nausea or vomiting no weakness, she has no chest pain atypical headaches lightheaded or dizziness; no alleviating measures have been attempted prior to arrival, the patient reports that she does have a history of reoccurring urinary tract infections, 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    The history is provided by the patient and medical records.       Nursing Notes were reviewed.    REVIEW OF SYSTEMS       Review of Systems   Constitutional:  Negative for chills, fatigue and fever.   HENT:  Negative for congestion and sore throat.    Respiratory:  Negative for cough and shortness of breath.    Cardiovascular:  Negative for chest pain and leg swelling.   Gastrointestinal:  Negative for abdominal pain, diarrhea, nausea and vomiting.   Genitourinary:  Positive for frequency. Negative for dysuria, flank pain,

## 2025-07-02 NOTE — DISCHARGE INSTRUCTIONS
Complete antibiotic as prescribed.    Urine culture is pending will take 48 to 72 hours to return if the antibiotic does not match the infection you will be notified via telephone and the antibiotic will be changed.    Return to the ER: Fevers, nausea or vomiting, weakness, continued worsening back pain, no urine output, blood in the urine, chest pain or breathing difficulty; or any other concerning symptoms.

## 2025-07-02 NOTE — ED PROVIDER NOTES
[facility] Emergency Department  [address]  [facility] [address]  Phone: [phone]  Fax: [phone]      Attending Physician Attestation    I personally made/approves the management plan for this patient's and take responsibility for the patient management.         (Please note that portions of this note were completed with a voice recognition program.  Efforts were made to edit the dictations but occasionally words are mis-transcribed.)    Gela Gann MD  Attending Emergency Medicine Physician        Gela Gann MD  07/02/25 2197

## 2025-07-03 LAB
MICROORGANISM SPEC CULT: NO GROWTH
SERVICE CMNT-IMP: NORMAL
SPECIMEN DESCRIPTION: NORMAL

## 2025-07-09 ENCOUNTER — TELEPHONE (OUTPATIENT)
Dept: NEPHROLOGY | Age: 80
End: 2025-07-09

## 2025-07-09 DIAGNOSIS — I10 PRIMARY HYPERTENSION: Primary | ICD-10-CM

## 2025-07-09 RX ORDER — AMLODIPINE BESYLATE 2.5 MG/1
2.5 TABLET ORAL DAILY
Qty: 90 TABLET | Refills: 3 | Status: SHIPPED | OUTPATIENT
Start: 2025-07-09

## 2025-07-09 NOTE — TELEPHONE ENCOUNTER
Patient was recently switched from amlodipine to lisinopril due to leg swelling and proteinuria, started checking Bps at home and they have been running 180's/70's. Called patient and she is agreeable to adding the amlodipine back in with the lisinopril, states she will wear compression stockings if leg swelling becomes bothersome. Patient also discussed recent recurrent UTI and hip pain for which she was instructed to follow up with infectious disease and her PCP.

## 2025-07-11 ENCOUNTER — HOSPITAL ENCOUNTER (EMERGENCY)
Age: 80
Discharge: HOME | End: 2025-07-11
Attending: STUDENT IN AN ORGANIZED HEALTH CARE EDUCATION/TRAINING PROGRAM
Payer: MEDICARE

## 2025-07-11 VITALS
OXYGEN SATURATION: 98 % | TEMPERATURE: 98.4 F | RESPIRATION RATE: 12 BRPM | HEART RATE: 53 BPM | BODY MASS INDEX: 26.65 KG/M2 | DIASTOLIC BLOOD PRESSURE: 69 MMHG | SYSTOLIC BLOOD PRESSURE: 122 MMHG | WEIGHT: 182 LBS

## 2025-07-11 DIAGNOSIS — N32.89 BLADDER SPASM: Primary | ICD-10-CM

## 2025-07-11 LAB
BACTERIA URNS QL MICRO: ABNORMAL
BILIRUB UR QL STRIP: NEGATIVE
CHARACTER UR: ABNORMAL
CLARITY UR: CLEAR
COLOR UR: YELLOW
EPI CELLS #/AREA URNS HPF: ABNORMAL /HPF (ref 0–5)
GLUCOSE UR STRIP-MCNC: NEGATIVE MG/DL
HGB UR QL STRIP.AUTO: NEGATIVE
KETONES UR STRIP-MCNC: NEGATIVE MG/DL
LEUKOCYTE ESTERASE UR QL STRIP: ABNORMAL
NITRITE UR QL STRIP: NEGATIVE
PH UR STRIP: 6 [PH] (ref 5–8)
PROT UR STRIP-MCNC: ABNORMAL MG/DL
RBC #/AREA URNS HPF: ABNORMAL /HPF (ref 0–2)
SP GR UR STRIP: 1.02 (ref 1–1.03)
UROBILINOGEN UR STRIP-ACNC: NORMAL EU/DL (ref 0–1)
WBC #/AREA URNS HPF: ABNORMAL /HPF (ref 0–5)

## 2025-07-11 PROCEDURE — 81001 URINALYSIS AUTO W/SCOPE: CPT

## 2025-07-11 PROCEDURE — 99283 EMERGENCY DEPT VISIT LOW MDM: CPT

## 2025-07-11 ASSESSMENT — ENCOUNTER SYMPTOMS
NAUSEA: 0
BACK PAIN: 0
PHOTOPHOBIA: 0
VOMITING: 0
DIARRHEA: 0
ABDOMINAL PAIN: 0

## 2025-07-11 ASSESSMENT — PAIN DESCRIPTION - LOCATION: LOCATION: ABDOMEN;BACK

## 2025-07-11 ASSESSMENT — PAIN SCALES - GENERAL: PAINLEVEL_OUTOF10: 0

## 2025-07-11 ASSESSMENT — PAIN DESCRIPTION - ORIENTATION: ORIENTATION: LEFT

## 2025-07-11 NOTE — ED PROVIDER NOTES
[facility] EMERGENCY DEPARTMENT     Emergency Department Attestation    I performed a history and physical examination of the patient and discussed management with the resident. I reviewed the resident’s note and agree with the documented findings and plan of care. Any areas of disagreement are noted on the chart. I was personally present for the key portions of any procedures. I have documented in the chart those procedures where I was not present during the key portions. I have reviewed the emergency nurses triage note. I agree with the chief complaint, past medical history, past surgical history, allergies, medications, social and family history as documented unless otherwise noted below. For Physician Assistant/ Nurse Practitioner cases/documentation I have personally evaluated this patient and have completed at least one if not all key elements of the E/M (history, physical exam, and MDM). Additional findings are as noted.        Chief Complaint   Patient presents with    Back Pain     Has had left side back pain since UTI July 4th weekend. Took last antibiotic yesterday       INITIAL VITALS:  weight is 82.6 kg (182 lb). Her oral temperature is 98.4 °F (36.9 °C). Her blood pressure is 122/69 and her pulse is 53. Her respiration is 12 and oxygen saturation is 98%.       DIAGNOSTIC RESULTS       RADIOLOGY:   Non-plain film images such as CT, Ultrasound and MRI are read by the radiologist. Plain radiographic images are visualized and the radiologist interpretations are reviewed as follows:     No orders to display         LABS:  Results for orders placed or performed during the hospital encounter of 07/11/25   Urinalysis with Reflex to Culture    Specimen: Urine   Result Value Ref Range    Color, UA Yellow Yellow    Turbidity UA Clear Clear    Glucose, Ur NEGATIVE NEGATIVE mg/dL    Bilirubin, Urine NEGATIVE NEGATIVE    Ketones, Urine NEGATIVE NEGATIVE mg/dL    Specific Gravity, UA 1.020 1.005 - 1.030

## 2025-07-11 NOTE — DISCHARGE INSTRUCTIONS
FOLLOW-UP  - Follow up with your primary care physician Amaya Mcmillan in 3 days.  - Follow up with your nephrologist or infectious disease physicians regarding recurrent UTI.   I provided Dr. Rios local office.     MEDICATIONS  - Continue taking your other home medications as directed.    ADDITIONAL INSTRUCTIONS  - Your symptoms are most likely bladder spasm that you are experiencing there is over the counter AZO or you can see your primary care for prescription medication.   - Please return immediately to the [facility] Emergency Department if you experience increased pain, confusion, bleeding in urine or stool or any other concerning symptoms.

## 2025-07-11 NOTE — ED PROVIDER NOTES
[facility] Emergency Department  [address]  [facility] [place] [address]  Phone: [phone]  Fax: [phone]    EMERGENCY DEPARTMENT ENCOUNTER          Pt Name: Stella Courtney  MRN: [id number]  Birthdate 1945  Date of evaluation: 7/11/2025      CHIEF COMPLAINT       Chief Complaint   Patient presents with    Back Pain     Has had left side back pain since UTI July 4th weekend. Took last antibiotic yesterday       HISTORY OF PRESENT ILLNESS       Stella Courtney is a 79 y.o. female who presents with left-sided pelvic fullness and some tenderness along her side.  She describes it as having fullness in her bladder and having spasms.  Patient recently had UTI around 4 July weekend and was treated appropriately with antibiotics.  Patient states that she woke up with pain around her left lower back but felt better after decided to come to the ED.  Patient denied any current back pain, abdominal pain, dysuria, urinary frequency, fever or chills.    REVIEW OF SYSTEMS       Review of Systems   Constitutional:  Negative for chills, diaphoresis, fatigue and fever.   Eyes:  Negative for photophobia and visual disturbance.   Cardiovascular:  Negative for chest pain, palpitations and leg swelling.   Gastrointestinal:  Negative for abdominal pain, diarrhea, nausea and vomiting.   Genitourinary:  Negative for difficulty urinating, dysuria, flank pain, frequency, hematuria, pelvic pain and urgency.        Bladder spasm and fullness   Musculoskeletal:  Negative for back pain and myalgias.   Neurological:  Negative for dizziness, weakness and light-headedness.   Psychiatric/Behavioral:  Negative for confusion.    All other systems reviewed and are negative.     PAST MEDICAL HISTORY    has a past medical history of Hyperlipidemia and UTI (urinary tract infection).    SURGICAL HISTORY      has a past surgical history that includes Hysterectomy, total abdominal (1989); Breast lumpectomy (2017); Total knee

## 2025-07-23 ENCOUNTER — HOSPITAL ENCOUNTER (EMERGENCY)
Age: 80
Discharge: HOME | End: 2025-07-23
Attending: EMERGENCY MEDICINE
Payer: MEDICARE

## 2025-07-23 VITALS
HEIGHT: 69 IN | HEART RATE: 53 BPM | OXYGEN SATURATION: 99 % | RESPIRATION RATE: 18 BRPM | WEIGHT: 192 LBS | SYSTOLIC BLOOD PRESSURE: 120 MMHG | TEMPERATURE: 97.9 F | DIASTOLIC BLOOD PRESSURE: 65 MMHG | BODY MASS INDEX: 28.44 KG/M2

## 2025-07-23 DIAGNOSIS — N39.0 URINARY TRACT INFECTION WITHOUT HEMATURIA, SITE UNSPECIFIED: Primary | ICD-10-CM

## 2025-07-23 LAB
BACTERIA URNS QL MICRO: ABNORMAL
BILIRUB UR QL STRIP: ABNORMAL
CHARACTER UR: ABNORMAL
CLARITY UR: ABNORMAL
COLOR UR: YELLOW
EPI CELLS #/AREA URNS HPF: ABNORMAL /HPF (ref 0–5)
GLUCOSE UR STRIP-MCNC: NEGATIVE MG/DL
HGB UR QL STRIP.AUTO: ABNORMAL
KETONES UR STRIP-MCNC: NEGATIVE MG/DL
LEUKOCYTE ESTERASE UR QL STRIP: ABNORMAL
NITRITE UR QL STRIP: POSITIVE
PH UR STRIP: 6 [PH] (ref 5–8)
PROT UR STRIP-MCNC: ABNORMAL MG/DL
RBC #/AREA URNS HPF: ABNORMAL /HPF (ref 0–2)
SP GR UR STRIP: 1.01 (ref 1–1.03)
UROBILINOGEN UR STRIP-ACNC: NORMAL EU/DL (ref 0–1)
WBC #/AREA URNS HPF: ABNORMAL /HPF (ref 0–5)

## 2025-07-23 PROCEDURE — 87186 SC STD MICRODIL/AGAR DIL: CPT

## 2025-07-23 PROCEDURE — 81001 URINALYSIS AUTO W/SCOPE: CPT

## 2025-07-23 PROCEDURE — 99283 EMERGENCY DEPT VISIT LOW MDM: CPT | Performed by: EMERGENCY MEDICINE

## 2025-07-23 PROCEDURE — 87088 URINE BACTERIA CULTURE: CPT

## 2025-07-23 PROCEDURE — 6370000000 HC RX 637 (ALT 250 FOR IP): Performed by: NURSE PRACTITIONER

## 2025-07-23 PROCEDURE — 87086 URINE CULTURE/COLONY COUNT: CPT

## 2025-07-23 RX ORDER — CEPHALEXIN 500 MG/1
500 CAPSULE ORAL 2 TIMES DAILY
Qty: 14 CAPSULE | Refills: 0 | Status: SHIPPED | OUTPATIENT
Start: 2025-07-23 | End: 2025-07-30

## 2025-07-23 RX ADMIN — CEPHALEXIN 500 MG: 250 CAPSULE ORAL at 10:40

## 2025-07-23 ASSESSMENT — PAIN - FUNCTIONAL ASSESSMENT: PAIN_FUNCTIONAL_ASSESSMENT: 0-10

## 2025-07-23 ASSESSMENT — PAIN SCALES - GENERAL: PAINLEVEL_OUTOF10: 0

## 2025-07-23 NOTE — ED PROVIDER NOTES
[facility] Emergency Department  [address]  [facility] [address]  Phone: [phone]  Fax: [phone]      Attending Physician Attestation    I personally made/approves the management plan for this patient's and take responsibility for the patient management.         (Please note that portions of this note were completed with a voice recognition program.  Efforts were made to edit the dictations but occasionally words are mis-transcribed.)    Miguel Medellin MD  Attending Emergency Medicine Physician        Miguel Medellin MD  07/23/25 1575

## 2025-07-23 NOTE — ED PROVIDER NOTES
[facility] EMERGENCY DEPARTMENT  EMERGENCY DEPARTMENT ENCOUNTER      Pt Name: Stella Courtney  MRN: [id number]  Birthdate 1945  Date of evaluation: 7/23/2025  Provider: GAGAN Milligan CNP  9:47 AM    CHIEF COMPLAINT       Chief Complaint   Patient presents with    Dysuria         HISTORY OF PRESENT ILLNESS    Stella Courtney is a 79 y.o. female who presents to the emergency department for evaluation of dysuria.  Patient reports foul odor and cloudy urine.  Symptoms have been ongoing for a few days.  No fevers no nausea no vomiting no abdominal pain.  No history of kidney stones no history of urinary retention.  Does get frequent UTIs.  She did have some diarrhea the other day and feels that is what is contributory to her symptoms today.  No history of pyelonephritis.  She denies any confusion altered mentation    HPI    Nursing Notes were reviewed.    REVIEW OF SYSTEMS       Review of Systems   All other systems reviewed and are negative.      Except as noted above the remainder of the review of systems was reviewed and negative.       PAST MEDICAL HISTORY     Past Medical History:   Diagnosis Date    Hyperlipidemia     UTI (urinary tract infection)     frequent         SURGICAL HISTORY       Past Surgical History:   Procedure Laterality Date    BREAST LUMPECTOMY  2017    CATARACT REMOVAL      HYSTERECTOMY, TOTAL ABDOMINAL (CERVIX REMOVED)  1989    TOTAL KNEE ARTHROPLASTY  12/2017         CURRENT MEDICATIONS       Current Discharge Medication List        CONTINUE these medications which have NOT CHANGED    Details   amLODIPine (NORVASC) 2.5 MG tablet Take 1 tablet by mouth daily  Qty: 90 tablet, Refills: 3    Associated Diagnoses: Primary hypertension      lisinopril (PRINIVIL;ZESTRIL) 5 MG tablet Take 1 tablet by mouth daily  Qty: 90 tablet, Refills: 1      anastrozole (ARIMIDEX) 1 MG tablet Take 1 tablet by mouth daily      atorvastatin (LIPITOR) 40 MG tablet Take 1 tablet by mouth daily

## 2025-07-23 NOTE — DISCHARGE INSTRUCTIONS
Home.  Antibiotics as prescribed until gone.  Plenty of fluids.  Follow closely with primary care physician.  We will call you if we need to change any antibiotics.  Return immediately for abdominal pain, nausea vomiting, back pain, fevers, or any other worsening symptoms or concern

## 2025-07-25 LAB
MICROORGANISM SPEC CULT: ABNORMAL
SPECIMEN DESCRIPTION: ABNORMAL